# Patient Record
Sex: MALE | Race: WHITE | NOT HISPANIC OR LATINO | Employment: FULL TIME | URBAN - METROPOLITAN AREA
[De-identification: names, ages, dates, MRNs, and addresses within clinical notes are randomized per-mention and may not be internally consistent; named-entity substitution may affect disease eponyms.]

---

## 2017-06-19 ENCOUNTER — OFFICE VISIT (OUTPATIENT)
Dept: FAMILY MEDICINE CLINIC | Facility: CLINIC | Age: 47
End: 2017-06-19

## 2017-06-19 VITALS
DIASTOLIC BLOOD PRESSURE: 82 MMHG | BODY MASS INDEX: 30.21 KG/M2 | SYSTOLIC BLOOD PRESSURE: 144 MMHG | RESPIRATION RATE: 16 BRPM | HEIGHT: 70 IN | TEMPERATURE: 98.2 F | WEIGHT: 211 LBS | OXYGEN SATURATION: 98 % | HEART RATE: 101 BPM

## 2017-06-19 DIAGNOSIS — M25.561 ACUTE PAIN OF RIGHT KNEE: Primary | ICD-10-CM

## 2017-06-19 DIAGNOSIS — M25.461 KNEE EFFUSION, RIGHT: ICD-10-CM

## 2017-06-19 PROCEDURE — 99213 OFFICE O/P EST LOW 20 MIN: CPT | Performed by: FAMILY MEDICINE

## 2017-06-19 RX ORDER — TRAMADOL HYDROCHLORIDE 50 MG/1
50 TABLET ORAL EVERY 6 HOURS PRN
Qty: 15 TABLET | Refills: 0 | Status: SHIPPED | OUTPATIENT
Start: 2017-06-19 | End: 2017-06-22 | Stop reason: SDUPTHER

## 2017-06-19 RX ORDER — PREDNISONE 10 MG/1
TABLET ORAL
Qty: 30 TABLET | Refills: 0 | Status: SHIPPED | OUTPATIENT
Start: 2017-06-19 | End: 2019-04-22

## 2017-06-19 NOTE — PROGRESS NOTES
"  Chief Complaint   Patient presents with   • rt knee pain     pt isn't able to apply weight, bend or straighten without pain       Subjective     Knee Pain    The incident occurred 5 to 7 days ago (had moved heavy htings and knee sore then . Went to work on Friday and increasd pain over the weekend and this am + swollen and motre pain ). The pain is present in the right knee. The quality of the pain is described as aching. The pain is moderate. The pain has been constant since onset. Associated symptoms include an inability to bear weight (able to work to pt weight on  it) and a loss of motion. Pertinent negatives include no numbness or tingling. He reports no foreign bodies present. The symptoms are aggravated by movement, weight bearing and palpation. Treatments tried: at 4:30, ibuprofen 800 mg and no help. The treatment provided no relief.       Past Medical History,Medications, Allergies, and social history was reviewed.    Review of Systems   Constitutional: Negative.    HENT: Negative.    Respiratory: Negative.    Cardiovascular: Negative.    Gastrointestinal: Negative.    Musculoskeletal: Positive for arthralgias, gait problem and joint swelling.   Neurological: Negative for tingling and numbness.   Psychiatric/Behavioral: Negative.        Objective     Vitals:    06/19/17 1532   BP: 144/82   Pulse: 101   Resp: 16   Temp: 98.2 °F (36.8 °C)   TempSrc: Temporal Artery    SpO2: 98%   Weight: 211 lb (95.7 kg)   Height: 70\" (177.8 cm)        Physical Exam   Constitutional: He is oriented to person, place, and time. He appears well-developed and well-nourished.   HENT:   Head: Normocephalic and atraumatic.   Right Ear: External ear normal.   Left Ear: External ear normal.   Eyes: EOM are normal. Pupils are equal, round, and reactive to light.   Pulmonary/Chest: Effort normal.   Musculoskeletal:        Right knee: He exhibits decreased range of motion, swelling and effusion. He exhibits normal patellar mobility. " Tenderness (lateral to the patella) found.   Difficult to assess stability due to significant swelling   Neurological: He is alert and oriented to person, place, and time.   Skin: Skin is warm and dry.   Psychiatric: He has a normal mood and affect. His behavior is normal.   Nursing note and vitals reviewed.        Assessment/Plan     Problem List Items Addressed This Visit     None      Visit Diagnoses     Acute pain of right knee    -  Primary    Relevant Medications    predniSONE (DELTASONE) 10 MG tablet    traMADol (ULTRAM) 50 MG tablet    Knee effusion, right        Relevant Medications    predniSONE (DELTASONE) 10 MG tablet    traMADol (ULTRAM) 50 MG tablet           Follow up: Return if symptoms worsen or fail to improve.     Arnol dated on 2017   was reviewed and appropriate.      DISCUSSION  We will give prednisone taper.  No other anti-inflammatory medication at this time.  Ice.  Rest.  Elevate and wrap.  If not improving by the end of the week, we will need further evaluation.  Off work this week.  Call sooner with problems.  Tramadol for severe pain.    MEDICATIONS PRESCRIBED  Requested Prescriptions     Signed Prescriptions Disp Refills   • predniSONE (DELTASONE) 10 MG tablet 30 tablet 0     Si po daily x 3 days then 3 po daily x 3 days then 2 po daily x 3 days then 1 po daily x 3 days   • traMADol (ULTRAM) 50 MG tablet 15 tablet 0     Sig: Take 1 tablet by mouth Every 6 (Six) Hours As Needed for Moderate Pain (4-6).          Jesse Power MD

## 2017-06-22 ENCOUNTER — TELEPHONE (OUTPATIENT)
Dept: FAMILY MEDICINE CLINIC | Facility: CLINIC | Age: 47
End: 2017-06-22

## 2017-06-22 DIAGNOSIS — M25.561 ACUTE PAIN OF RIGHT KNEE: Primary | ICD-10-CM

## 2017-06-22 DIAGNOSIS — M25.461 KNEE EFFUSION, RIGHT: ICD-10-CM

## 2017-06-22 RX ORDER — TRAMADOL HYDROCHLORIDE 50 MG/1
50 TABLET ORAL EVERY 6 HOURS PRN
Qty: 30 TABLET | Refills: 0 | OUTPATIENT
Start: 2017-06-22 | End: 2019-04-22

## 2017-06-22 NOTE — TELEPHONE ENCOUNTER
----- Message from Ro Haywood sent at 6/22/2017 10:56 AM EDT -----  Contact: SLADE  PATIENT WIFE CALLED BECAUSE SOUMYA'S KNEE IS NOT ANY BETTER, STILL SWOLLEN AND ACTUALLY WORSE THAT IT WAS ON Monday.  IF YOU WANT TO GO AHEAD AN ORDER THE MRI, AND HAVE THE NURSE TO GIVE HIM A CALL BACK.   NO PREFERENCE ON WHERE THE MRI WOULD BE DONE.     [phone]

## 2017-06-22 NOTE — TELEPHONE ENCOUNTER
SPOKE WITH PT WIFE AND SHE STATES THAT PT WILL NEED DR NOTE AS HIS KNEE IS WORSE TODAY THAN IT HAS BEEN THE WHOLE TIME. WIFE ASKING IF WE CAN GET THIS STAT FOR PT.

## 2017-06-22 NOTE — TELEPHONE ENCOUNTER
Please call.  I have placed an order.  The insurance may deny initially and we may need to get a regular x-ray but going to try for the MRI first.

## 2017-06-22 NOTE — TELEPHONE ENCOUNTER
----- Message from Ro Haywood sent at 6/22/2017  2:37 PM EDT -----  Contact: SLADE  PATIENT WIFE CALLED BACK AND SHE IS CONCERNED ABOUT HIM HAVING ENOUGH PAIN MEDICATION TO GET HIM THROUGH THE WEEK END AND UNTIL HE CAN GET IN TO SEE THE SPECIALIST. HAVING PROBLEMS WITH HIS INSURANCE.     SHE CAN COME AND

## 2017-06-26 ENCOUNTER — TELEPHONE (OUTPATIENT)
Dept: FAMILY MEDICINE CLINIC | Facility: CLINIC | Age: 47
End: 2017-06-26

## 2017-06-28 ENCOUNTER — APPOINTMENT (OUTPATIENT)
Dept: MRI IMAGING | Facility: HOSPITAL | Age: 47
End: 2017-06-28

## 2017-06-29 ENCOUNTER — TELEPHONE (OUTPATIENT)
Dept: FAMILY MEDICINE CLINIC | Facility: CLINIC | Age: 47
End: 2017-06-29

## 2017-06-29 NOTE — TELEPHONE ENCOUNTER
----- Message from Carlene Santos sent at 6/29/2017 11:40 AM EDT -----  Contact: Moreno Clement returned your call about his results.     He would like a call back.

## 2017-06-30 ENCOUNTER — TELEPHONE (OUTPATIENT)
Dept: FAMILY MEDICINE CLINIC | Facility: CLINIC | Age: 47
End: 2017-06-30

## 2017-06-30 NOTE — TELEPHONE ENCOUNTER
Okay to write note to return to work on 7/5/2017 for light duty with no lifting greater than 30 pounds through 7/18/2017.

## 2017-06-30 NOTE — TELEPHONE ENCOUNTER
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 HE WANTS TO RETURN ON July 5 WITH [employer] STATING THAT HE CAN'T LIFT MORE THAN 30 LBS FOR A COUPLE OF WEEKS TILL HE FEELS ONE HUNDRED PERCENT BETTER.  [phone]

## 2019-04-22 ENCOUNTER — OFFICE VISIT (OUTPATIENT)
Dept: FAMILY MEDICINE CLINIC | Facility: CLINIC | Age: 49
End: 2019-04-22

## 2019-04-22 VITALS
SYSTOLIC BLOOD PRESSURE: 160 MMHG | HEART RATE: 90 BPM | RESPIRATION RATE: 18 BRPM | DIASTOLIC BLOOD PRESSURE: 88 MMHG | WEIGHT: 209 LBS | BODY MASS INDEX: 29.92 KG/M2 | TEMPERATURE: 99 F | HEIGHT: 70 IN

## 2019-04-22 DIAGNOSIS — M25.561 ACUTE PAIN OF RIGHT KNEE: Primary | ICD-10-CM

## 2019-04-22 PROCEDURE — 99213 OFFICE O/P EST LOW 20 MIN: CPT | Performed by: FAMILY MEDICINE

## 2019-04-22 RX ORDER — TRAMADOL HYDROCHLORIDE 50 MG/1
50 TABLET ORAL EVERY 6 HOURS PRN
Qty: 20 TABLET | Refills: 0 | Status: SHIPPED | OUTPATIENT
Start: 2019-04-22 | End: 2019-05-15

## 2019-04-22 RX ORDER — PREDNISONE 20 MG/1
TABLET ORAL
Qty: 20 TABLET | Refills: 0 | Status: SHIPPED | OUTPATIENT
Start: 2019-04-22 | End: 2019-05-15

## 2019-04-22 NOTE — PROGRESS NOTES
Assessment/Plan       Problems Addressed this Visit     None      Visit Diagnoses     Acute pain of right knee    -  Primary    Possible gout    Relevant Medications    predniSONE (DELTASONE) 20 MG tablet    traMADol (ULTRAM) 50 MG tablet    Other Relevant Orders    CBC & Differential    Uric Acid            Follow up: Return if symptoms worsen or fail to improve.     DISCUSSION  Acute pain and swelling of the right knee.  May be gout.  Check labs as noted.  Start prednisone and tramadol for pain.  Side effects explained.  Call or follow-up if not improving or sooner if worsening.      MEDICATIONS PRESCRIBED  Requested Prescriptions     Signed Prescriptions Disp Refills   • predniSONE (DELTASONE) 20 MG tablet 20 tablet 0     Sig: 3 po x 3 days then 2 po x 3 days then 1 po x 3 days then 1/2 po x 3 days   • traMADol (ULTRAM) 50 MG tablet 20 tablet 0     Sig: Take 1 tablet by mouth Every 6 (Six) Hours As Needed for Moderate Pain .             Arnol dated on 4/22/2019   was reviewed and appropriate.        -------------------------------------------    Subjective     Chief Complaint   Patient presents with   • Knee Pain     right, 4 days          Knee Pain    The incident occurred 3 to 5 days ago (history of injury long ago, history fo gout, and feels similaryly). There was no injury mechanism. The pain is present in the right knee (pain began in the back of the knee. + swelling and now pain in the front ). The pain is moderate. The symptoms are aggravated by movement and weight bearing. He has tried ice and heat for the symptoms.       Started Friday and was not able to extend the leg and needed to keep bent  Hurts to walk on it  Anterior medial pain and tenderness        Social History     Tobacco Use   Smoking Status Current Some Day Smoker   • Types: Cigarettes   Smokeless Tobacco Never Used        Past Medical History,Medications, Allergies, and social history was reviewed.      Review of Systems  "  Constitutional: Negative.  Negative for fever.   HENT: Negative.    Respiratory: Negative.    Cardiovascular: Negative.    Gastrointestinal: Negative.    Musculoskeletal: Positive for arthralgias.   Psychiatric/Behavioral: Positive for sleep disturbance.       Objective     Vitals:    04/22/19 1054   BP: 160/88   Pulse: 90   Resp: 18   Temp: 99 °F (37.2 °C)   Weight: 94.8 kg (209 lb)   Height: 177.8 cm (70\")          Physical Exam   Constitutional: He is oriented to person, place, and time. He appears well-developed and well-nourished.   HENT:   Head: Normocephalic and atraumatic.   Right Ear: External ear normal.   Left Ear: External ear normal.   Eyes: EOM are normal. Pupils are equal, round, and reactive to light.   Cardiovascular: Normal rate, regular rhythm and normal heart sounds.   Pulmonary/Chest: Effort normal and breath sounds normal. No respiratory distress. He has no wheezes. He has no rales.   Musculoskeletal:        Right knee: He exhibits decreased range of motion, swelling and effusion. Tenderness (left anterior ) found.   Neurological: He is alert and oriented to person, place, and time.   Skin: Skin is warm and dry.   Psychiatric: He has a normal mood and affect. His behavior is normal.   Nursing note and vitals reviewed.    No redness of the knee but there is some mild warmth.            Jesse Power MD    "

## 2019-04-23 LAB
BASOPHILS # BLD AUTO: 0.06 10*3/MM3 (ref 0–0.2)
BASOPHILS NFR BLD AUTO: 0.8 % (ref 0–1.5)
EOSINOPHIL # BLD AUTO: 0.11 10*3/MM3 (ref 0–0.4)
EOSINOPHIL NFR BLD AUTO: 1.4 % (ref 0.3–6.2)
ERYTHROCYTE [DISTWIDTH] IN BLOOD BY AUTOMATED COUNT: 13.1 % (ref 12.3–15.4)
HCT VFR BLD AUTO: 51.3 % (ref 37.5–51)
HGB BLD-MCNC: 17 G/DL (ref 13–17.7)
IMM GRANULOCYTES # BLD AUTO: 0.02 10*3/MM3 (ref 0–0.05)
IMM GRANULOCYTES NFR BLD AUTO: 0.3 % (ref 0–0.5)
LYMPHOCYTES # BLD AUTO: 1.65 10*3/MM3 (ref 0.7–3.1)
LYMPHOCYTES NFR BLD AUTO: 20.8 % (ref 19.6–45.3)
MCH RBC QN AUTO: 31.9 PG (ref 26.6–33)
MCHC RBC AUTO-ENTMCNC: 33.1 G/DL (ref 31.5–35.7)
MCV RBC AUTO: 96.2 FL (ref 79–97)
MONOCYTES # BLD AUTO: 0.47 10*3/MM3 (ref 0.1–0.9)
MONOCYTES NFR BLD AUTO: 5.9 % (ref 5–12)
NEUTROPHILS # BLD AUTO: 5.63 10*3/MM3 (ref 1.7–7)
NEUTROPHILS NFR BLD AUTO: 70.8 % (ref 42.7–76)
NRBC BLD AUTO-RTO: 0 /100 WBC (ref 0–0.2)
PLATELET # BLD AUTO: 194 10*3/MM3 (ref 140–450)
RBC # BLD AUTO: 5.33 10*6/MM3 (ref 4.14–5.8)
URATE SERPL-MCNC: 6.2 MG/DL (ref 3.4–7)
WBC # BLD AUTO: 7.94 10*3/MM3 (ref 3.4–10.8)

## 2019-05-15 ENCOUNTER — OFFICE VISIT (OUTPATIENT)
Dept: FAMILY MEDICINE CLINIC | Facility: CLINIC | Age: 49
End: 2019-05-15

## 2019-05-15 VITALS
HEART RATE: 78 BPM | HEIGHT: 70 IN | WEIGHT: 214 LBS | TEMPERATURE: 99 F | BODY MASS INDEX: 30.64 KG/M2 | DIASTOLIC BLOOD PRESSURE: 72 MMHG | SYSTOLIC BLOOD PRESSURE: 124 MMHG | RESPIRATION RATE: 16 BRPM

## 2019-05-15 DIAGNOSIS — B34.8 PARAINFLUENZA: Primary | ICD-10-CM

## 2019-05-15 LAB
EXPIRATION DATE: NORMAL
FLUAV AG NPH QL: NEGATIVE
FLUBV AG NPH QL: NEGATIVE
INTERNAL CONTROL: NORMAL
Lab: NORMAL

## 2019-05-15 PROCEDURE — 87804 INFLUENZA ASSAY W/OPTIC: CPT | Performed by: FAMILY MEDICINE

## 2019-05-15 PROCEDURE — 99213 OFFICE O/P EST LOW 20 MIN: CPT | Performed by: FAMILY MEDICINE

## 2019-05-15 RX ORDER — IBUPROFEN 800 MG/1
800 TABLET ORAL EVERY 6 HOURS PRN
Qty: 30 TABLET | Refills: 0 | Status: SHIPPED | OUTPATIENT
Start: 2019-05-15 | End: 2019-10-30 | Stop reason: SDUPTHER

## 2019-05-15 NOTE — PROGRESS NOTES
Subjective   Moreno Dinero is a 48 y.o. male.     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   HENT: Negative for congestion, sinus pressure and sore throat.    Respiratory: Negative for cough and shortness of breath.    Cardiovascular: Negative for chest pain.   Gastrointestinal: Negative for constipation, diarrhea, nausea and vomiting.   Musculoskeletal: Positive for myalgias.   Skin: Negative for rash.       Objective   Physical Exam   Constitutional: He appears well-developed and well-nourished.   HENT:   Head: Normocephalic and atraumatic.   Right Ear: Hearing, tympanic membrane, external ear and ear canal normal.   Left Ear: Hearing, tympanic membrane, external ear and ear canal normal.   Nose: Nose normal.   Mouth/Throat: Uvula is midline, oropharynx is clear and moist and mucous membranes are normal.   Eyes: Conjunctivae and EOM are normal.   Neck: Normal range of motion.   Cardiovascular: Normal rate, regular rhythm and normal heart sounds.   Pulmonary/Chest: Effort normal and breath sounds normal.   Lymphadenopathy:     He has no cervical adenopathy.   Psychiatric: He has a normal mood and affect. His behavior is normal.   Nursing note and vitals reviewed.      Assessment/Plan   Moreno was seen today for generalized body aches and fever.    Diagnoses and all orders for this visit:    Parainfluenza  -     POC Influenza A / B    Other orders  -     ibuprofen (ADVIL,MOTRIN) 800 MG tablet; Take 1 tablet by mouth Every 6 (Six) Hours As Needed for Mild Pain .    flu negative,  suspect parainfluenza.  Treat with fluids and NSAIDs.  gatorade and ibuprofen script sent in.  Rest, fluids and off work today and tomorrow.  Pt to call with any other issues.

## 2019-10-30 ENCOUNTER — HOSPITAL ENCOUNTER (OUTPATIENT)
Dept: GENERAL RADIOLOGY | Facility: HOSPITAL | Age: 49
Discharge: HOME | End: 2019-10-30
Admitting: PHYSICIAN ASSISTANT

## 2019-10-30 ENCOUNTER — OFFICE VISIT (OUTPATIENT)
Dept: FAMILY MEDICINE CLINIC | Facility: CLINIC | Age: 49
End: 2019-10-30

## 2019-10-30 VITALS
DIASTOLIC BLOOD PRESSURE: 72 MMHG | TEMPERATURE: 98.4 F | HEART RATE: 80 BPM | OXYGEN SATURATION: 98 % | SYSTOLIC BLOOD PRESSURE: 126 MMHG | WEIGHT: 207.2 LBS | RESPIRATION RATE: 18 BRPM | BODY MASS INDEX: 29.73 KG/M2

## 2019-10-30 DIAGNOSIS — M79.671 RIGHT FOOT PAIN: Primary | ICD-10-CM

## 2019-10-30 DIAGNOSIS — M77.41 METATARSALGIA OF RIGHT FOOT: ICD-10-CM

## 2019-10-30 PROCEDURE — 73630 X-RAY EXAM OF FOOT: CPT

## 2019-10-30 PROCEDURE — 99213 OFFICE O/P EST LOW 20 MIN: CPT | Performed by: PHYSICIAN ASSISTANT

## 2019-10-30 RX ORDER — IBUPROFEN 800 MG/1
800 TABLET ORAL EVERY 6 HOURS PRN
Qty: 30 TABLET | Refills: 2 | Status: SHIPPED | OUTPATIENT
Start: 2019-10-30

## 2019-10-30 NOTE — PROGRESS NOTES
Subjective   Moreno Dinero is a 49 y.o. male.     History of Present Illness   Pt presents with CC of R foot pain. No injury. Pain is sore and throbbing in nature. No injury. No swelling or skin changes. Felt along width of forefoot. Painful to ambulate. Started yesterday. Hx of right ankle fracture and R knee issues.   Works in maintenance so on feet all day. Using OTC foot supports.   No hx of gout   No numbness or tingling of distal extremity     The following portions of the patient's history were reviewed and updated as appropriate: allergies, current medications, past family history, past medical history, past social history, past surgical history and problem list.    Review of Systems   Constitutional: Negative.  Negative for chills, diaphoresis, fatigue and fever.   HENT: Negative.  Negative for congestion, ear discharge, ear pain, hearing loss, nosebleeds, postnasal drip, sinus pressure, sneezing and sore throat.    Eyes: Negative.    Respiratory: Negative.  Negative for cough, chest tightness, shortness of breath and wheezing.    Cardiovascular: Negative.  Negative for chest pain, palpitations and leg swelling.   Gastrointestinal: Negative for abdominal distention, abdominal pain, blood in stool, constipation, diarrhea, nausea and vomiting.   Genitourinary: Negative.  Negative for difficulty urinating, dysuria, flank pain, frequency, hematuria and urgency.   Musculoskeletal: Positive for arthralgias and gait problem. Negative for back pain, joint swelling, myalgias, neck pain and neck stiffness.   Skin: Negative.  Negative for color change, pallor, rash and wound.   Neurological: Negative for dizziness, syncope, weakness, light-headedness, numbness and headaches.       Objective    Blood pressure 126/72, pulse 80, temperature 98.4 °F (36.9 °C), resp. rate 18, weight 94 kg (207 lb 3.2 oz), SpO2 98 %.     Physical Exam   Constitutional: He is oriented to person, place, and time. He appears well-developed  and well-nourished.   HENT:   Head: Normocephalic and atraumatic.   Right Ear: External ear normal.   Left Ear: External ear normal.   Nose: Nose normal.   Eyes: Conjunctivae are normal.   Cardiovascular: Normal rate, regular rhythm and normal heart sounds.   Pulmonary/Chest: Effort normal and breath sounds normal. No respiratory distress. He has no wheezes. He has no rales. He exhibits no tenderness.   Abdominal: No hernia.   Musculoskeletal: He exhibits no edema or deformity.        Right foot: There is tenderness and bony tenderness. There is normal range of motion.   Antalgic gait         Neurological: He is alert and oriented to person, place, and time.   Skin: Skin is warm and dry.   Psychiatric: He has a normal mood and affect. His behavior is normal. Judgment and thought content normal.   Nursing note and vitals reviewed.      Assessment/Plan   Moreno was seen today for lower extremity issue.    Diagnoses and all orders for this visit:    Right foot pain  -     XR Foot 3+ View Right  -     ibuprofen (ADVIL,MOTRIN) 800 MG tablet; Take 1 tablet by mouth Every 6 (Six) Hours As Needed for Mild Pain .    Metatarsalgia of right foot  -     ibuprofen (ADVIL,MOTRIN) 800 MG tablet; Take 1 tablet by mouth Every 6 (Six) Hours As Needed for Mild Pain .      Proceed with XR as noted. Likely metatarsalgia: rest, ice, elevation and NSAID. F/u pending imaging.

## 2019-10-30 NOTE — PROGRESS NOTES
I have reviewed the notes, assessments, and/or procedures performed by Rush Zambrano, I concur with her documentation of Moreno Dinero.

## 2020-01-27 ENCOUNTER — OFFICE VISIT (OUTPATIENT)
Dept: FAMILY MEDICINE CLINIC | Facility: CLINIC | Age: 50
End: 2020-01-27

## 2020-01-27 VITALS
BODY MASS INDEX: 31.07 KG/M2 | DIASTOLIC BLOOD PRESSURE: 88 MMHG | TEMPERATURE: 97.8 F | SYSTOLIC BLOOD PRESSURE: 142 MMHG | HEIGHT: 70 IN | WEIGHT: 217 LBS | HEART RATE: 97 BPM | OXYGEN SATURATION: 98 % | RESPIRATION RATE: 18 BRPM

## 2020-01-27 DIAGNOSIS — J06.9 VIRAL UPPER RESPIRATORY TRACT INFECTION: Primary | ICD-10-CM

## 2020-01-27 PROCEDURE — 99213 OFFICE O/P EST LOW 20 MIN: CPT | Performed by: FAMILY MEDICINE

## 2020-01-27 NOTE — PROGRESS NOTES
Assessment/Plan       Problems Addressed this Visit     None      Visit Diagnoses     Viral upper respiratory tract infection    -  Primary            Follow up: Return if symptoms worsen or fail to improve.     DISCUSSION  Most likely viral infection.  No bacterial cause seen.  Reassured.  Increase fluids and rest.  Over-the-counter symptomatic relief.  Call or follow-up if not improving or worsening.  He is improving at this time.    Recommend follow-up for complete physical later this year.  Will need blood work and set up for colon cancer screening either Cologuard or colonoscopy.    Recommend increase fluids.  Decrease spicy foods to see if that helps with halitosis.  If not, may need further work-up.         MEDICATIONS PRESCRIBED  Requested Prescriptions      No prescriptions requested or ordered in this encounter          -------------------------------------------    Subjective     Chief Complaint   Patient presents with   • Cough     since saturday    • Fever   • Nasal Congestion     pressure          URI    This is a new problem. Episode onset: x 2-3 days, sat afternoon. The problem has been gradually improving. Maximum temperature: subj fever. Associated symptoms include congestion (not coming out nose), coughing ( dry), headaches (pressure), neck pain (sore feeling), sinus pain ( forehead and cheek ) and a sore throat (hurt on sat). Pertinent negatives include no ear pain. Associated symptoms comments: Has some shortness of breath. Seems better today. Treatments tried: daytime and nighttime kathy seltzer and tylenol for fever. The treatment provided mild relief.   ears ringing  No travel recently    Does feel a good bit better today      Boss had been sick  boss had been to [place]  Had a viral infection and caused heart issues ( cardiomyopathy). He is now on a LifeVest    Halitosis  Drinks propel and vitamin water  Does eat a lot spicy food  Had for year or more            Social History     Tobacco Use  "  Smoking Status Current Some Day Smoker   • Types: Cigarettes   Smokeless Tobacco Never Used          Past Medical History,Medications, Allergies, and social history was reviewed.          Review of Systems   Constitutional: Positive for fever.   HENT: Positive for congestion (not coming out nose) and sore throat (hurt on sat). Negative for ear pain.    Respiratory: Positive for cough ( dry).    Cardiovascular: Negative.    Gastrointestinal: Negative.    Musculoskeletal: Positive for neck pain (sore feeling).   Psychiatric/Behavioral: Negative.        Objective     Vitals:    01/27/20 1133 01/27/20 1213   BP: 158/86 142/88   Pulse: 97    Resp: 18    Temp: 97.8 °F (36.6 °C)    SpO2: 98%    Weight: 98.4 kg (217 lb)    Height: 177.8 cm (70\")           Physical Exam   Constitutional: Vital signs are normal. He appears well-developed and well-nourished.   HENT:   Head: Normocephalic and atraumatic.   Right Ear: Hearing, tympanic membrane, external ear and ear canal normal.   Left Ear: Hearing, tympanic membrane, external ear and ear canal normal.   Mouth/Throat: Oropharynx is clear and moist.   Eyes: Pupils are equal, round, and reactive to light. Conjunctivae, EOM and lids are normal.   Neck: Normal range of motion. Neck supple. No thyromegaly present.   Cardiovascular: Normal rate, regular rhythm and normal heart sounds. Exam reveals no friction rub.   No murmur heard.  Pulmonary/Chest: Effort normal and breath sounds normal. No respiratory distress. He has no wheezes. He has no rales.   Abdominal: Normal appearance.   Musculoskeletal: He exhibits no edema.   Neurological: He is alert. He has normal strength.   Skin: Skin is warm and dry.   Psychiatric: He has a normal mood and affect. His speech is normal. Cognition and memory are normal.   Nursing note and vitals reviewed.                Jesse Power MD    "

## 2020-04-16 ENCOUNTER — TELEPHONE (OUTPATIENT)
Dept: FAMILY MEDICINE CLINIC | Facility: CLINIC | Age: 50
End: 2020-04-16

## 2020-04-16 RX ORDER — PREDNISONE 20 MG/1
TABLET ORAL
Qty: 20 TABLET | Refills: 0 | Status: SHIPPED | OUTPATIENT
Start: 2020-04-16 | End: 2020-12-17 | Stop reason: SDUPTHER

## 2020-04-16 NOTE — TELEPHONE ENCOUNTER
Patient's wife stated that his knee is swollen and wondered if Dr. Power would call him in some Prednisone like he has done before.  He can be reached at [phone]    [facility]

## 2020-04-16 NOTE — TELEPHONE ENCOUNTER
Pt's knee pain and swelling started on Tues evening when he got home from work. Denies redness or heat to the area.  Pt does not want to wait til Monday for this.

## 2020-07-28 ENCOUNTER — TELEPHONE (OUTPATIENT)
Dept: FAMILY MEDICINE CLINIC | Facility: CLINIC | Age: 50
End: 2020-07-28

## 2020-07-28 NOTE — TELEPHONE ENCOUNTER
Pt has NO symptoms. His Wife is going to [facility] today for testing. He'll keep us posted.

## 2020-07-28 NOTE — TELEPHONE ENCOUNTER
Is he having any symptoms?     I may not be able to order a test if no symptoms and no exposure to a definite positive patient.     He may need to go to the [place] state web site and check the site for current covid testing sites for people who have no exposure(to known positive)  and no symptoms.

## 2020-07-28 NOTE — TELEPHONE ENCOUNTER
PATIENT WIFE CALLED AND REPORTS THAT SHE HAS A COUGH AND WAS SENT HOME FROM WORK AND TOLD TO QUARANTINE AND GET COVID-19 TESTED.  PATIENT HAS BEEN AROUND HIS WIFE THEREFORE HIS EMPLOYER  TOLD HIM QUARANTINE AND/OR GET COVID-19 TESTED.    PATIENT IS REQUESTING ORDERS FOR A COVID-19 TEST.    PLEASE CALL AND ADVISE  [phone]

## 2020-12-17 ENCOUNTER — TELEPHONE (OUTPATIENT)
Dept: FAMILY MEDICINE CLINIC | Facility: CLINIC | Age: 50
End: 2020-12-17

## 2020-12-17 RX ORDER — PREDNISONE 20 MG/1
TABLET ORAL
Qty: 20 TABLET | Refills: 0 | Status: SHIPPED | OUTPATIENT
Start: 2020-12-17 | End: 2021-02-22

## 2020-12-17 NOTE — TELEPHONE ENCOUNTER
Caller: PIERRE WRIGHT    Relationship: WIFE    Best call back number: [phone]    Medication needed:   Requested Prescriptions     Pending Prescriptions Disp Refills   • predniSONE (DELTASONE) 20 MG tablet 20 tablet 0     Sig: 3 po x 3 days then 2 po x 3 days then 1 po x 3 days then 1/2 po x 3 days       When do you need the refill by: ASAP    What details did the patient provide when requesting the medication: PATIENT IS EXPERIENCING SWELLING AND PAIN IN KNEE AGAIN  PATIENT WOULD LIKE TO HAVE THIS MEDICATION REFILLED BECAUSE IT WORKED LAST TIME    Does the patient have less than a 3 day supply:  [x] Yes  [] No    What is the patient's preferred pharmacy: [facility] - [address] [phone] [facility] [phone] FX

## 2020-12-17 NOTE — TELEPHONE ENCOUNTER
Please call, requested meds sent to pharmacy. BUt if happens again, rec recheck in the office.

## 2021-02-22 ENCOUNTER — OFFICE VISIT (OUTPATIENT)
Dept: FAMILY MEDICINE CLINIC | Facility: CLINIC | Age: 51
End: 2021-02-22

## 2021-02-22 VITALS
HEART RATE: 86 BPM | WEIGHT: 219 LBS | BODY MASS INDEX: 31.35 KG/M2 | SYSTOLIC BLOOD PRESSURE: 146 MMHG | HEIGHT: 70 IN | DIASTOLIC BLOOD PRESSURE: 78 MMHG | RESPIRATION RATE: 18 BRPM | TEMPERATURE: 98.2 F

## 2021-02-22 DIAGNOSIS — M54.50 LUMBAR BACK PAIN: ICD-10-CM

## 2021-02-22 DIAGNOSIS — R10.9 RIGHT FLANK PAIN: Primary | ICD-10-CM

## 2021-02-22 LAB
BILIRUB BLD-MCNC: NEGATIVE MG/DL
CLARITY, POC: CLEAR
COLOR UR: YELLOW
GLUCOSE UR STRIP-MCNC: NEGATIVE MG/DL
KETONES UR QL: NEGATIVE
LEUKOCYTE EST, POC: NEGATIVE
NITRITE UR-MCNC: NEGATIVE MG/ML
PH UR: 6 [PH] (ref 5–8)
PROT UR STRIP-MCNC: NEGATIVE MG/DL
RBC # UR STRIP: NEGATIVE /UL
SP GR UR: 1.02 (ref 1–1.03)
UROBILINOGEN UR QL: NORMAL

## 2021-02-22 PROCEDURE — 81003 URINALYSIS AUTO W/O SCOPE: CPT | Performed by: FAMILY MEDICINE

## 2021-02-22 PROCEDURE — 99213 OFFICE O/P EST LOW 20 MIN: CPT | Performed by: FAMILY MEDICINE

## 2021-02-22 NOTE — PROGRESS NOTES
Assessment/Plan       Diagnoses and all orders for this visit:    1. Right flank pain (Primary)  -     POC Urinalysis Dipstick, Automated    2. Lumbar back pain           Follow up: Return if symptoms worsen or fail to improve.     DISCUSSION  Flank pain and lumbar back pain.  Urinalysis was normal.  Suspect musculoskeletal cause.  Recommend ibuprofen 800 mg 3 times a day for the next 3 to 4 days.  Call or follow-up if not improving.          MEDICATIONS PRESCRIBED  Requested Prescriptions      No prescriptions requested or ordered in this encounter            -------------------------------------------    Subjective     Chief Complaint   Patient presents with   • Back Pain   • Flank Pain         Flank Pain  This is a new problem. The current episode started in the past 7 days (x 4 days). The problem occurs intermittently. The problem has been waxing and waning since onset. Pain location: right low back and radiates to the groin rigth side. The quality of the pain is described as aching. Radiates to: groin. The symptoms are aggravated by position and twisting. Pertinent negatives include no bladder incontinence, dysuria or leg pain. (Occ in hip when gets up  BM ok, no diarrhea  )      Pain increases rotating to the right    Rested this weekend    Better today than in last 4 days    Called in today      2 weeks ago, drinking V8 daily at work  Drinking vitamin water    One kidney stone in the past  10-12 yrs  Had intense pain then  Could feel it move and then passed quickly    Wife had a bad yeast infection          Social History     Tobacco Use   Smoking Status Current Some Day Smoker   • Types: Cigarettes   Smokeless Tobacco Never Used          Past Medical History,Medications, Allergies, and social history was reviewed.          Review of Systems   Constitutional: Negative.    HENT: Negative.    Respiratory: Negative.    Cardiovascular: Negative.    Gastrointestinal: Negative.    Genitourinary: Positive for  "flank pain. Negative for dysuria and urinary incontinence.   Musculoskeletal: Positive for back pain.       Objective     Vitals:    02/22/21 1147   BP: 146/78   Pulse: 86   Resp: 18   Temp: 98.2 °F (36.8 °C)   Weight: 99.3 kg (219 lb)   Height: 177.8 cm (70\")          Physical Exam  Vitals signs and nursing note reviewed.   Constitutional:       Appearance: He is well-developed.   HENT:      Head: Normocephalic and atraumatic.      Right Ear: External ear normal.      Left Ear: External ear normal.   Eyes:      Pupils: Pupils are equal, round, and reactive to light.   Cardiovascular:      Rate and Rhythm: Normal rate and regular rhythm.      Heart sounds: Normal heart sounds.   Pulmonary:      Effort: Pulmonary effort is normal. No respiratory distress.      Breath sounds: Normal breath sounds. No wheezing or rales.   Abdominal:      General: Abdomen is flat. Bowel sounds are normal. There is no distension.      Tenderness: There is no abdominal tenderness. There is no guarding or rebound.   Musculoskeletal:      Lumbar back: He exhibits decreased range of motion and tenderness ( Paraspinous musculature).   Skin:     General: Skin is warm and dry.   Neurological:      Mental Status: He is alert and oriented to person, place, and time.   Psychiatric:         Behavior: Behavior normal.                     Jesse Power MD    "

## 2021-05-07 ENCOUNTER — OFFICE VISIT (OUTPATIENT)
Dept: FAMILY MEDICINE CLINIC | Facility: CLINIC | Age: 51
End: 2021-05-07

## 2021-05-07 VITALS
DIASTOLIC BLOOD PRESSURE: 80 MMHG | OXYGEN SATURATION: 95 % | TEMPERATURE: 99 F | WEIGHT: 220.4 LBS | SYSTOLIC BLOOD PRESSURE: 130 MMHG | BODY MASS INDEX: 31.62 KG/M2 | HEART RATE: 85 BPM

## 2021-05-07 DIAGNOSIS — M62.838 SPASM OF RIGHT PIRIFORMIS MUSCLE: Primary | ICD-10-CM

## 2021-05-07 DIAGNOSIS — M54.31 RIGHT SIDED SCIATICA: ICD-10-CM

## 2021-05-07 PROCEDURE — 96372 THER/PROPH/DIAG INJ SC/IM: CPT | Performed by: FAMILY MEDICINE

## 2021-05-07 PROCEDURE — 99213 OFFICE O/P EST LOW 20 MIN: CPT | Performed by: FAMILY MEDICINE

## 2021-05-07 RX ORDER — TRIAMCINOLONE ACETONIDE 40 MG/ML
40 INJECTION, SUSPENSION INTRA-ARTICULAR; INTRAMUSCULAR ONCE
Status: COMPLETED | OUTPATIENT
Start: 2021-05-07 | End: 2021-05-07

## 2021-05-07 RX ORDER — PREDNISONE 10 MG/1
10 TABLET ORAL DAILY
Qty: 21 TABLET | Refills: 0 | Status: SHIPPED | OUTPATIENT
Start: 2021-05-07

## 2021-05-07 RX ORDER — CYCLOBENZAPRINE HCL 10 MG
10 TABLET ORAL 3 TIMES DAILY PRN
Qty: 30 TABLET | Refills: 0 | Status: SHIPPED | OUTPATIENT
Start: 2021-05-07

## 2021-05-07 RX ADMIN — TRIAMCINOLONE ACETONIDE 40 MG: 40 INJECTION, SUSPENSION INTRA-ARTICULAR; INTRAMUSCULAR at 15:45

## 2021-05-07 NOTE — PROGRESS NOTES
Chief Complaint  Muscle Pain and Sciatica    Subjective          Moreno Dinero presents to [facility]  Back Pain  This is a new problem. The current episode started in the past 7 days. The problem occurs constantly. The pain is present in the gluteal. The quality of the pain is described as aching and shooting. The pain radiates to the right knee. The pain is moderate. The pain is worse during the day. The symptoms are aggravated by standing. Stiffness is present in the morning. Associated symptoms include leg pain. Pertinent negatives include no numbness or tingling. Risk factors include obesity. He has tried NSAIDs and bed rest (IcyHot) for the symptoms. The treatment provided mild relief.   Muscle in right buttock feels tight.      The following portions of the patient's history were reviewed and updated as appropriate: allergies, current medications, past family history, past medical history, past social history, past surgical history and problem list.    Objective   Vital Signs:   /80   Pulse 85   Temp 99 °F (37.2 °C) (Temporal)   Wt 100 kg (220 lb 6.4 oz)   SpO2 95%   BMI 31.62 kg/m²     Physical Exam  Vitals and nursing note reviewed.   Constitutional:       Appearance: He is well-developed.   HENT:      Head: Normocephalic and atraumatic.      Nose: Nose normal.   Eyes:      Conjunctiva/sclera: Conjunctivae normal.   Cardiovascular:      Rate and Rhythm: Normal rate and regular rhythm.      Heart sounds: Normal heart sounds.   Pulmonary:      Effort: Pulmonary effort is normal. No respiratory distress.      Breath sounds: Normal breath sounds.   Musculoskeletal:         General: No deformity.   Skin:     General: Skin is warm and dry.   Neurological:      General: No focal deficit present.      Mental Status: He is alert and oriented to person, place, and time.   Psychiatric:         Behavior: Behavior normal.        Result Review :                 Assessment  and Plan    Diagnoses and all orders for this visit:    1. Spasm of right piriformis muscle (Primary)  -     triamcinolone acetonide (KENALOG-40) injection 40 mg  -     predniSONE (DELTASONE) 10 MG tablet; Take 1 tablet by mouth Daily.  Dispense: 21 tablet; Refill: 0  -     cyclobenzaprine (FLEXERIL) 10 MG tablet; Take 1 tablet by mouth 3 (Three) Times a Day As Needed for Muscle Spasms.  Dispense: 30 tablet; Refill: 0    2. Right sided sciatica  -     triamcinolone acetonide (KENALOG-40) injection 40 mg  -     predniSONE (DELTASONE) 10 MG tablet; Take 1 tablet by mouth Daily.  Dispense: 21 tablet; Refill: 0  -     cyclobenzaprine (FLEXERIL) 10 MG tablet; Take 1 tablet by mouth 3 (Three) Times a Day As Needed for Muscle Spasms.  Dispense: 30 tablet; Refill: 0    Kenalog provided in office. Steroid taper and muscle relaxer to treat.  He is aware that muscle relaxer can cause drowsiness, use with caution.  Follow-up if symptoms do not resolve.      Follow Up   Return if symptoms worsen or fail to improve.  Patient was given instructions and counseling regarding his condition or for health maintenance advice. Please see specific information pulled into the AVS if appropriate.

## 2021-05-11 ENCOUNTER — TELEPHONE (OUTPATIENT)
Dept: FAMILY MEDICINE CLINIC | Facility: CLINIC | Age: 51
End: 2021-05-11

## 2021-05-11 NOTE — TELEPHONE ENCOUNTER
Caller: Moreno Dinero    Relationship: Self    Best call back number: [phone]    What form or medical record are you requesting: WORK EXCUSE FOR Monday AND TUESDAY    Who is requesting this form or medical record from you: PATIENT    How would you like to receive the form or medical records (pick-up, mail, fax): PICKUP TODAY      Timeframe paperwork needed: ASAP    Additional notes: PATIENT WANTED TO KNOW IF HE COULD STOP BY AND  WORK EXCUSE FOR Monday AND Tuesday WHILE HE WAS IN TOWN AROUND 4PM     PLEASE ADVISE

## 2023-01-29 NOTE — PATIENT INSTRUCTIONS
Piriformis Syndrome Rehab  Ask your health care provider which exercises are safe for you. Do exercises exactly as told by your health care provider and adjust them as directed. It is normal to feel mild stretching, pulling, tightness, or discomfort as you do these exercises. Stop right away if you feel sudden pain or your pain gets worse. Do not begin these exercises until told by your health care provider.  Stretching and range-of-motion exercises  These exercises warm up your muscles and joints and improve the movement and flexibility of your hip and pelvis. The exercises also help to relieve pain, numbness, and tingling.  Hip rotation  This is an exercise in which you lie on your back and stretch the muscles that rotate your hip (hip rotators) to stretch your buttocks.  1. Lie on your back on a firm surface.  2. Pull your left / right knee toward your same shoulder with your left / right hand until your knee is pointing toward the ceiling. Hold your left / right ankle with your other hand.  3. Keeping your knee steady, gently pull your left / right ankle toward your other shoulder until you feel a stretch in your buttocks.  4. Hold this position for __________ seconds.  Repeat __________ times. Complete this exercise __________ times a day.  Hip extensor  This is an exercise in which you lie on your back and pull your knee to your chest.  1. Lie on your back on a firm surface. Both of your legs should be straight.  2. Pull your left / right knee to your chest. Hold your leg in this position by holding onto the back of your thigh or the front of your knee.  3. Hold this position for __________ seconds.  4. Slowly return to the starting position.  Repeat __________ times. Complete this exercise __________ times a day.  Strengthening exercises  These exercises build strength and endurance in your hip and thigh muscles. Endurance is the ability to use your muscles for a long time, even after they get  tired.  Straight leg raises, side-lying  This exercise strengthens the muscles that rotate the leg at the hip and move it away from your body (hip abductors).  1. Lie on your side with your left / right leg in the top position. Lie so your head, shoulder, knee, and hip line up. Bend your bottom knee to help you balance.  2. Lift your top leg 4-6 inches (10-15 cm) while keeping your toes pointed straight ahead.  3. Hold this position for __________ seconds.  4. Slowly lower your leg to the starting position.  5. Let your muscles relax completely after each repetition.  Repeat __________ times. Complete this exercise __________ times a day.  Hip abduction and rotation  This is sometimes called quadruped (on hands and knees) exercises.  1. Get on your hands and knees on a firm, lightly padded surface. Your hands should be directly below your shoulders, and your knees should be directly below your hips.  2. Lift your left / right knee out to the side. Keep your knee bent. Do not twist your body.  3. Hold this position for __________ seconds.  4. Slowly lower your leg.  Repeat __________ times. Complete this exercise __________ times a day.  Straight leg raises, face-down  This exercise stretches the muscles that move your hips away from the front of the pelvis (hip extensors).  1. Lie on your abdomen on a bed or a firm surface with a pillow under your hips.  2. Squeeze your buttocks muscles and lift your left / right leg about 4-6 inches (10-15 cm) off the bed. Do not let your back arch.  3. Hold this position for __________ seconds.  4. Slowly lower your leg to the starting position.  5. Let your muscles relax completely after each repetition.  Repeat __________ times. Complete this exercise __________ times a day.  This information is not intended to replace advice given to you by your health care provider. Make sure you discuss any questions you have with your health care provider.  Document Revised: 04/09/2020  Document Reviewed: 10/10/2019  Elsevier Patient Education © 2021 [employer]     29-Jan-2023 18:35

## 2023-04-21 ENCOUNTER — OFFICE VISIT (OUTPATIENT)
Dept: FAMILY MEDICINE CLINIC | Facility: CLINIC | Age: 53
End: 2023-04-21
Payer: COMMERCIAL

## 2023-04-21 VITALS
HEIGHT: 70 IN | BODY MASS INDEX: 29.2 KG/M2 | SYSTOLIC BLOOD PRESSURE: 144 MMHG | DIASTOLIC BLOOD PRESSURE: 84 MMHG | WEIGHT: 204 LBS | TEMPERATURE: 98 F | RESPIRATION RATE: 18 BRPM | HEART RATE: 101 BPM | OXYGEN SATURATION: 97 %

## 2023-04-21 DIAGNOSIS — J01.00 ACUTE NON-RECURRENT MAXILLARY SINUSITIS: Primary | ICD-10-CM

## 2023-04-21 PROCEDURE — 99213 OFFICE O/P EST LOW 20 MIN: CPT | Performed by: FAMILY MEDICINE

## 2023-04-21 RX ORDER — PREDNISONE 20 MG/1
40 TABLET ORAL DAILY
Qty: 10 TABLET | Refills: 0 | Status: SHIPPED | OUTPATIENT
Start: 2023-04-21

## 2023-04-21 RX ORDER — AMOXICILLIN AND CLAVULANATE POTASSIUM 875; 125 MG/1; MG/1
1 TABLET, FILM COATED ORAL 2 TIMES DAILY
Qty: 20 TABLET | Refills: 0 | Status: SHIPPED | OUTPATIENT
Start: 2023-04-21

## 2023-04-21 NOTE — PROGRESS NOTES
Subjective   Moreno Dinero is a 52 y.o. male.     History of Present Illness     7-10 days with lots of R facial pressure, under his nose  Lots of foul smelling mucous form right side the past several days    Tooth pain as well as the sinus pressure    Went to ER and had normal CT head          Review of Systems    Objective   Physical Exam  Vitals and nursing note reviewed.   Constitutional:       General: He is not in acute distress.     Appearance: Normal appearance. He is well-developed.   HENT:      Head: Normocephalic and atraumatic.      Right Ear: Hearing, tympanic membrane, ear canal and external ear normal.      Left Ear: Hearing, tympanic membrane, ear canal and external ear normal.      Nose:      Right Sinus: Maxillary sinus tenderness present.      Mouth/Throat:      Pharynx: Uvula midline.   Eyes:      Conjunctiva/sclera: Conjunctivae normal.   Cardiovascular:      Rate and Rhythm: Normal rate and regular rhythm.      Heart sounds: Normal heart sounds.   Pulmonary:      Effort: Pulmonary effort is normal.      Breath sounds: Normal breath sounds.   Musculoskeletal:      Cervical back: Normal range of motion.   Lymphadenopathy:      Cervical: No cervical adenopathy.   Neurological:      Mental Status: He is alert and oriented to person, place, and time.   Psychiatric:         Mood and Affect: Mood normal.         Behavior: Behavior normal.         Thought Content: Thought content normal.         Judgment: Judgment normal.         Assessment & Plan   Diagnoses and all orders for this visit:    1. Acute non-recurrent maxillary sinusitis (Primary)  -     amoxicillin-clavulanate (Augmentin) 875-125 MG per tablet; Take 1 tablet by mouth 2 (Two) Times a Day.  Dispense: 20 tablet; Refill: 0  -     predniSONE (DELTASONE) 20 MG tablet; Take 2 tablets by mouth Daily.  Dispense: 10 tablet; Refill: 0  -     Chlorcyclizine-Pseudoephed 25-60 MG tablet; 1/2-1 po q 8 hours PRN  Dispense: 30 tablet; Refill:  1    augmentin, pred burst, PRN stahist.  INB would consider getting ENT involved and will request CT from [facility]

## 2023-10-30 ENCOUNTER — OFFICE VISIT (OUTPATIENT)
Dept: FAMILY MEDICINE CLINIC | Facility: CLINIC | Age: 53
End: 2023-10-30
Payer: COMMERCIAL

## 2023-10-30 VITALS
WEIGHT: 209.4 LBS | DIASTOLIC BLOOD PRESSURE: 96 MMHG | HEIGHT: 70 IN | BODY MASS INDEX: 29.98 KG/M2 | SYSTOLIC BLOOD PRESSURE: 162 MMHG | OXYGEN SATURATION: 98 % | TEMPERATURE: 98.2 F | HEART RATE: 101 BPM | RESPIRATION RATE: 19 BRPM

## 2023-10-30 DIAGNOSIS — M25.461 PAIN AND SWELLING OF RIGHT KNEE: Primary | ICD-10-CM

## 2023-10-30 DIAGNOSIS — M25.561 PAIN AND SWELLING OF RIGHT KNEE: Primary | ICD-10-CM

## 2023-10-30 PROCEDURE — 99213 OFFICE O/P EST LOW 20 MIN: CPT | Performed by: PHYSICIAN ASSISTANT

## 2023-10-30 RX ORDER — TRAMADOL HYDROCHLORIDE 50 MG/1
50 TABLET ORAL EVERY 6 HOURS PRN
Qty: 20 TABLET | Refills: 0 | Status: SHIPPED | OUTPATIENT
Start: 2023-10-30

## 2023-10-30 RX ORDER — PREDNISONE 10 MG/1
TABLET ORAL
Qty: 21 EACH | Refills: 0 | Status: SHIPPED | OUTPATIENT
Start: 2023-10-30 | End: 2023-11-03 | Stop reason: SDUPTHER

## 2023-10-30 NOTE — PROGRESS NOTES
"Subjective   Moreno Dinero is a 53 y.o. male.     Knee Pain        Pt presents with CC of right knee pain and swelling. Symptoms started over the weekend. Only recent change in activity had been chasing his dog.  No fall or known injury at this time   Hx with pain and swelling on this side in 2017. Progressed to MRI which did not show any tears, did have small baker's cyst and some arthritic changes. Had another flare in 2019 and treated with steroid taper and tramadol. Was off work for awhile during that time   Symptoms of difficulty weight bearing and fully bending or straightening leg out.   Using crutches  Unable to work at this time   Told by PCP he may be experiencing gout. Joint is not currently red, maybe some mild warmth     The following portions of the patient's history were reviewed and updated as appropriate: allergies, current medications, past family history, past medical history, past social history, past surgical history, and problem list.    Review of Systems  As noted per HPI     Objective   Blood pressure 162/96, pulse 101, temperature 98.2 °F (36.8 °C), resp. rate 19, height 177.8 cm (70\"), weight 95 kg (209 lb 6.4 oz), SpO2 98%.     Physical Exam  Vitals and nursing note reviewed.   Constitutional:       Appearance: Normal appearance.   Cardiovascular:      Rate and Rhythm: Normal rate and regular rhythm.   Pulmonary:      Effort: Pulmonary effort is normal.      Breath sounds: Normal breath sounds.   Musculoskeletal:      Right knee: Bony tenderness present. Decreased range of motion. Tenderness present over the lateral joint line.   Neurological:      Mental Status: He is alert and oriented to person, place, and time.   Psychiatric:         Mood and Affect: Mood normal.         Behavior: Behavior normal.         Assessment & Plan   Diagnoses and all orders for this visit:    1. Pain and swelling of right knee (Primary)  -     predniSONE (DELTASONE) 10 MG (21) dose pack; Use as directed on " package  Dispense: 21 each; Refill: 0    Steroid taper, tramadol, and RICE therapy. Arnol reviewed and appropriate   Work note for this week provided   Pt will update office if symptoms do not improve to proceed with additional imaging or ortho consult

## 2023-11-03 DIAGNOSIS — M25.561 PAIN AND SWELLING OF RIGHT KNEE: ICD-10-CM

## 2023-11-03 DIAGNOSIS — M25.461 PAIN AND SWELLING OF RIGHT KNEE: ICD-10-CM

## 2023-11-03 NOTE — TELEPHONE ENCOUNTER
Patient still has significant swelling in his leg, he says [facility] mentioned extending his time off another week if he was not better by Friday and he is not. Ok to extend office note? Patient will also be out of Prednisone tomorrow, can we refill? No fever, leg is warm but not as hot as it was. Meryl Pabon

## 2023-11-06 ENCOUNTER — OFFICE VISIT (OUTPATIENT)
Dept: FAMILY MEDICINE CLINIC | Facility: CLINIC | Age: 53
End: 2023-11-06
Payer: COMMERCIAL

## 2023-11-06 VITALS
DIASTOLIC BLOOD PRESSURE: 84 MMHG | HEART RATE: 92 BPM | HEIGHT: 70 IN | RESPIRATION RATE: 18 BRPM | OXYGEN SATURATION: 98 % | WEIGHT: 205.5 LBS | SYSTOLIC BLOOD PRESSURE: 126 MMHG | BODY MASS INDEX: 29.42 KG/M2 | TEMPERATURE: 98.9 F

## 2023-11-06 DIAGNOSIS — M25.461 PAIN AND SWELLING OF RIGHT KNEE: Primary | ICD-10-CM

## 2023-11-06 DIAGNOSIS — M25.561 PAIN AND SWELLING OF RIGHT KNEE: Primary | ICD-10-CM

## 2023-11-06 PROCEDURE — 99213 OFFICE O/P EST LOW 20 MIN: CPT | Performed by: PHYSICIAN ASSISTANT

## 2023-11-06 RX ORDER — PREDNISONE 10 MG/1
TABLET ORAL
Qty: 21 EACH | Refills: 0 | Status: SHIPPED | OUTPATIENT
Start: 2023-11-06 | End: 2023-11-13

## 2023-11-06 NOTE — PROGRESS NOTES
"Subjective   Moreno Dinero is a 53 y.o. male.     Pain       Pt presents for follow up for right knee pain and swelling, started over the past two weeks. No specific injury    Last evaluated on 10/30 and has been off of work since that time   Initial improvement with steroid course but still having symptoms. Tramadol prn for pain   Has hx with this knee with swelling and pain. MRI in 2017 showed arthritis and baker's cyst. No tears. Questionable gout hx     The following portions of the patient's history were reviewed and updated as appropriate: allergies, current medications, past family history, past medical history, past social history, past surgical history, and problem list.    Review of Systems  As noted per HPI     Objective   Blood pressure 126/84, pulse 92, temperature 98.9 °F (37.2 °C), resp. rate 18, height 177.8 cm (70\"), weight 93.2 kg (205 lb 8 oz), SpO2 98%.    Physical Exam  Vitals and nursing note reviewed.   Constitutional:       Appearance: Normal appearance.   Cardiovascular:      Rate and Rhythm: Normal rate and regular rhythm.   Pulmonary:      Effort: Pulmonary effort is normal.   Musculoskeletal:      Right knee: Swelling and bony tenderness present. Decreased range of motion. Tenderness present over the lateral joint line.   Skin:     General: Skin is warm and dry.   Neurological:      Mental Status: He is alert and oriented to person, place, and time.   Psychiatric:         Mood and Affect: Mood normal.         Behavior: Behavior normal.         Assessment & Plan   Diagnoses and all orders for this visit:    1. Pain and swelling of right knee (Primary)  -     Ambulatory Referral to Orthopedic Surgery  -     Ambulatory Referral to Physical Therapy Evaluate and treat  -     XR Knee 1 or 2 View Right    Additional course of steroids sent to pharmacy   Work note for this week as well. Okay for STD if he proceeds with this   If not improving, proceed with XR and referrals as noted. Pt voiced " understanding

## 2023-11-09 ENCOUNTER — TELEPHONE (OUTPATIENT)
Dept: FAMILY MEDICINE CLINIC | Facility: CLINIC | Age: 53
End: 2023-11-09

## 2023-11-09 NOTE — TELEPHONE ENCOUNTER
Patient has XR order already in place he may go ahead and obtained, and I have already started the process of getting him in with ortho and physical therapy per our visit this week

## 2023-11-09 NOTE — TELEPHONE ENCOUNTER
Caller: Marie Dinero    Relationship: Emergency Contact    Best call back number: [phone]     What is the best time to reach you: ANYTIME    Who are you requesting to speak with (clinical staff, provider,  specific staff member): TONIO RIVERO    What was the call regarding: PATIENT IS STILL HAVING KNEE PAIN. PATIENT'S WIFE WOULD LIKE TO DISCUSS WHAT THE NEXT STEPS WILL BE.

## 2023-11-10 ENCOUNTER — TELEPHONE (OUTPATIENT)
Dept: FAMILY MEDICINE CLINIC | Facility: CLINIC | Age: 53
End: 2023-11-10
Payer: COMMERCIAL

## 2023-11-10 NOTE — TELEPHONE ENCOUNTER
Caller: Marie Dinero    Relationship: Emergency Contact    Best call back number:[phone]     What form or medical record are you requesting: LETTER FOR LIGHT DUTY, AND MAYBE SHORT TERM DISABILITY, DOCTOR'S EXCUSE IS NEEDED IF NOT FINISHED ON 11/10/23      Who is requesting this form or medical record from you: PATIENT'S WIFE    How would you like to receive the form or medical records (pick-up, mail, fax):     Timeframe paperwork needed:  AS SOON AS POSSIBLE    Additional notes: PLEASE CALL WHEN READY.

## 2023-11-13 ENCOUNTER — TELEPHONE (OUTPATIENT)
Dept: FAMILY MEDICINE CLINIC | Facility: CLINIC | Age: 53
End: 2023-11-13
Payer: COMMERCIAL

## 2023-11-13 ENCOUNTER — LAB (OUTPATIENT)
Dept: LAB | Facility: HOSPITAL | Age: 53
End: 2023-11-13
Payer: COMMERCIAL

## 2023-11-13 ENCOUNTER — OFFICE VISIT (OUTPATIENT)
Age: 53
End: 2023-11-13
Payer: COMMERCIAL

## 2023-11-13 VITALS
SYSTOLIC BLOOD PRESSURE: 143 MMHG | HEIGHT: 70 IN | DIASTOLIC BLOOD PRESSURE: 80 MMHG | BODY MASS INDEX: 29.35 KG/M2 | WEIGHT: 205 LBS

## 2023-11-13 DIAGNOSIS — M17.11 PRIMARY OSTEOARTHRITIS OF RIGHT KNEE: ICD-10-CM

## 2023-11-13 DIAGNOSIS — M25.461 EFFUSION OF RIGHT KNEE: ICD-10-CM

## 2023-11-13 DIAGNOSIS — M25.561 RIGHT KNEE PAIN, UNSPECIFIED CHRONICITY: Primary | ICD-10-CM

## 2023-11-13 DIAGNOSIS — M71.21 SYNOVIAL CYST OF RIGHT POPLITEAL SPACE: ICD-10-CM

## 2023-11-13 PROCEDURE — 89060 EXAM SYNOVIAL FLUID CRYSTALS: CPT

## 2023-11-13 PROCEDURE — 20611 DRAIN/INJ JOINT/BURSA W/US: CPT | Performed by: STUDENT IN AN ORGANIZED HEALTH CARE EDUCATION/TRAINING PROGRAM

## 2023-11-13 PROCEDURE — 99204 OFFICE O/P NEW MOD 45 MIN: CPT | Performed by: STUDENT IN AN ORGANIZED HEALTH CARE EDUCATION/TRAINING PROGRAM

## 2023-11-13 RX ORDER — TRIAMCINOLONE ACETONIDE 40 MG/ML
80 INJECTION, SUSPENSION INTRA-ARTICULAR; INTRAMUSCULAR
Status: COMPLETED | OUTPATIENT
Start: 2023-11-13 | End: 2023-11-13

## 2023-11-13 RX ORDER — LIDOCAINE HYDROCHLORIDE 10 MG/ML
4 INJECTION, SOLUTION EPIDURAL; INFILTRATION; INTRACAUDAL; PERINEURAL
Status: COMPLETED | OUTPATIENT
Start: 2023-11-13 | End: 2023-11-13

## 2023-11-13 RX ORDER — BUPIVACAINE HYDROCHLORIDE 5 MG/ML
4 INJECTION, SOLUTION EPIDURAL; INTRACAUDAL
Status: COMPLETED | OUTPATIENT
Start: 2023-11-13 | End: 2023-11-13

## 2023-11-13 RX ORDER — IBUPROFEN 800 MG/1
800 TABLET ORAL EVERY 6 HOURS PRN
COMMUNITY

## 2023-11-13 RX ADMIN — TRIAMCINOLONE ACETONIDE 80 MG: 40 INJECTION, SUSPENSION INTRA-ARTICULAR; INTRAMUSCULAR at 15:29

## 2023-11-13 RX ADMIN — BUPIVACAINE HYDROCHLORIDE 4 ML: 5 INJECTION, SOLUTION EPIDURAL; INTRACAUDAL at 15:29

## 2023-11-13 RX ADMIN — LIDOCAINE HYDROCHLORIDE 4 ML: 10 INJECTION, SOLUTION EPIDURAL; INFILTRATION; INTRACAUDAL; PERINEURAL at 15:29

## 2023-11-13 NOTE — TELEPHONE ENCOUNTER
Ortho tried to reach him three times and were unable to contact. He is able to call them at [phone] to schedule. Referral for PT was sent to [facility] and unable to reach, voicemail left. He is able to call them to schedule at [phone].

## 2023-11-13 NOTE — PROGRESS NOTES
Procedure   - Large Joint Arthrocentesis: R knee on 11/13/2023 3:29 PM  Indications: pain  Details: 21 G needle, ultrasound-guided anterolateral approach  Medications: 4 mL bupivacaine (PF) 0.5 %; 4 mL lidocaine PF 1% 1 %; 80 mg triamcinolone acetonide 40 MG/ML  Aspirate: 37 mL yellow and clear; sent for lab analysis  Outcome: tolerated well, no immediate complications  Procedure, treatment alternatives, risks and benefits explained, specific risks discussed. Consent was given by the patient. Immediately prior to procedure a time out was called to verify the correct patient, procedure, equipment, support staff and site/side marked as required. Patient was prepped and draped in the usual sterile fashion.

## 2023-11-13 NOTE — PROGRESS NOTES
[facility] [place] [facility] Office Visit     Office Visit       Date: 11/13/2023   Patient Name: Moreno Dinero  MRN: [id number]  YOB: 1970    Referring Physician: Rush Rivera PA     Chief Complaint:   Chief Complaint   Patient presents with    Right Knee - Pain       History of Present Illness:   Moreno Dinero is a 53 y.o. male who presents with right knee pain for 2 week(s). Onset atraumatic and gradual in nature. Pain is localized to the anterior knee and is a 3/10 on the pain scale. Pain is described as aching. Associated symptoms include pain, swelling, and stiffness. The pain is worse with walking, rising from seated position, and any movement of the joint; resting, ice, and lying down make it better. Previous treatments have included: oral steroids for 3 months duration or longer. Although some transient relief was reported with these interventions, these conservative measures have failed and symptoms have persisted. The patient is limited in daily activities and has had a significant decrease in quality of life as a result. He denies fevers, chills, or constitutional symptoms.    Subjective   Review of Systems: Review of Systems   Constitutional:  Negative for chills, fever, unexpected weight gain and unexpected weight loss.   HENT:  Negative for congestion, postnasal drip and rhinorrhea.    Eyes:  Negative for blurred vision.   Respiratory:  Negative for shortness of breath.    Cardiovascular:  Negative for leg swelling.   Gastrointestinal:  Negative for abdominal pain, nausea and vomiting.   Genitourinary:  Negative for difficulty urinating.   Musculoskeletal:  Positive for arthralgias. Negative for gait problem, joint swelling and myalgias.   Skin:  Negative for skin lesions and wound.   Neurological:  Negative for dizziness, weakness, light-headedness and numbness.   Hematological:  Does not bruise/bleed easily.   Psychiatric/Behavioral:   Please get updated weight today    Thx    RP "Negative for depressed mood.         I have reviewed the following portions of the patient's history:History of Present Illness and review of systems.    Past Medical History: History reviewed. No pertinent past medical history.    Past Surgical History:   Past Surgical History:   Procedure Laterality Date    ARM TENDON REPAIR Left     CHOLECYSTECTOMY      HERNIA REPAIR         Family History:   Family History   Problem Relation Age of Onset    Hypertension Mother        Social History:   Social History     Socioeconomic History    Marital status:    Tobacco Use    Smoking status: Some Days     Types: Cigarettes    Smokeless tobacco: Never   Substance and Sexual Activity    Alcohol use: Yes     Comment: occasionally    Drug use: No       Medications:   Current Outpatient Medications:     ibuprofen (ADVIL,MOTRIN) 800 MG tablet, Take 1 tablet by mouth Every 6 (Six) Hours As Needed for Mild Pain., Disp: , Rfl:     Allergies:   Allergies   Allergen Reactions    Hydrocodone Irritability       I reviewed the patient's chief complaint, history of present illness, review of systems, past medical history, surgical history, family history, social history, medications and allergy list.     Objective    Vital Signs:   Vitals:    11/13/23 1442   BP: 143/80   Weight: 93 kg (205 lb)   Height: 177.8 cm (70\")     Body mass index is 29.41 kg/m². BMI is >= 25 and <30. (Overweight) The following options were offered after discussion;: exercise counseling/recommendations and nutrition counseling/recommendations      In this visit the patient was advised to stop smoking and was offered tobacco cessation measures and resources, including NRT and/or medication intervention. At least 5 minutes was spent on face-to-face counseling regarding smoking cessation.     Ortho Exam:  Constitutional: General Appearance: healthy-appearing, NAD, and normal body habitus.   Gait and Station: Appearance: limp and antalgic gait and ambulates with no " assitive devices.   Skin: Right Lower Extremity: normal. Left Lower Extremity: normal.   Psychiatric: Orientation: oriented to time, place, and person. Mood and Affect: normal mood and affect and active and alert.   Right knee exam:   There is swelling and effusion but no warmth or erythema of the knee.    The skin is clear.    Overall the alignment of the knee is varus   The patient has 5/5 strength with ankle plantar flexion, dorsiflexion, inversion, eversion, and great toe extension.    Sensation is grossly present to light touch in the superficial peroneal, deep peroneal, and tibial nerve distributions.    The dorsalis pedis pulse is 2+ and the foot is well perfused with brisk capillary refill.   Active ROM is 0° to 110°.   Passive ROM is 0° to 110°.   The knee shows no gross instability to varus/valgus stress testing, anterior/posterior drawer sign, and Lachman testing.    There is tenderness to palpation over the medial and lateral joint line. Patellar grind test is positive.   Hip ROM is full and painless.  Left knee exam:   Normal knee contour.   No evidence of swelling or effusion. Full range of motion.    Results Review:   Imaging Results (Last 24 Hours)       Procedure Component Value Units Date/Time    US Guided Injection [[id number]] Resulted: 11/13/23 1535     Updated: 11/13/23 1601        I personally reviewed the radiographs of the right knee from 11/12/2023.  No acute fracture or dislocation.  Mild degenerative changes noted in all 3 joint compartments of the knee.    Procedures    Assessment / Plan    Assessment/Plan:   Diagnoses and all orders for this visit:    1. Right knee pain, unspecified chronicity (Primary)  -     US Guided Injection    2. Primary osteoarthritis of right knee  -     MRI Knee Right Without Contrast; Future  -     Crystal Exam, Fluid - Synovial Fluid,; Future    3. Effusion of right knee  -     MRI Knee Right Without Contrast; Future  -     Crystal Exam, Fluid - Synovial  Fluid,; Future    4. Synovial cyst of right popliteal space    Other orders  -     - Large Joint Arthrocentesis: R knee    Large swollen knee without any clear known mechanism of injury.  He was playing outside with his dog 1 day without injury.  He went to sleep and woke up the next morning with a large swollen knee.  Prednisone has alleviated some of his pain but not significantly reduced his swelling.  He has not been returned to work.  He has limited range of motion of the knee.  Radiographs of the right knee show mild degenerative changes in all compartments of the knee.  Does have a remote history of gout and I am concerned for pseudogout in the knee.  He also has a Baker's cyst and has had this in the past as well.  Today, I recommend that we aspirate the knee and send for crystal analysis.  I also inject the knee with corticosteroid to help with pain and swelling.  Would also obtain MRI of the right knee as long as the crystal analysis is negative.  Risk and benefits this procedure were discussed and he is elected to proceed.  Tolerated the procedure well.  See procedure note.  He will follow-up once the imaging study is completed to review the results.    Previous documentation reviewed: 11/6/2023-office visit-[facility].    Previous imaging studies reviewed: 11/12/2023-radiographs of the right knee.  6/28/2017-MRI right knee.    Follow Up:   Return for MRI RIGHT KNEE REVIEW.      Anjum Savage MD  [facility] [place] [facility]

## 2023-11-13 NOTE — TELEPHONE ENCOUNTER
Need XR results from [place] if he went and got those last week. Get update on patient's symptoms- he is probably looking at Short term disability where he has been off the past couple of weeks

## 2023-11-14 ENCOUNTER — TELEPHONE (OUTPATIENT)
Age: 53
End: 2023-11-14
Payer: COMMERCIAL

## 2023-11-14 LAB — CRYSTALS FLD MICRO: NORMAL

## 2023-11-14 NOTE — PROGRESS NOTES
Please let him know that crystals were noted. What we did yesterday should get him better soon. We need to cancel the MRI.

## 2023-11-14 NOTE — TELEPHONE ENCOUNTER
"Spoke to pt and gave him Dr. 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 Pt verbalized understanding that once the cortisone injection \"kicks in\", if patient feels he can return to work sooner then we can provide him with a note for work for clearance. Pt will hold off on PT until he feels that he is able to tolerate it. Will contact us for that referral if needed before 3 wk f.u appt. Appt scheduled for Monday 12/4.    No further action needed at this time.    Piper RODRIGUEZ CMA ([facility]), ROT   "

## 2023-11-14 NOTE — PROGRESS NOTES
Shot may take a few days to full kick in. As it does, he should start walking and using his knee. Once he feels like he can work, let me know and I can give him clearance. I still want to see him in 3 weeks but am willing to clear him before then if he feels ready.

## 2023-11-14 NOTE — TELEPHONE ENCOUNTER
----- Message from David Savage MD sent at 11/14/2023  9:21 AM EST -----  Shot may take a few days to full kick in. As it does, he should start walking and using his knee. Once he feels like he can work, let me know and I can give him clearance. I still want to see him in 3 weeks but am willing to clear him before then if   he feels ready.

## 2023-11-20 ENCOUNTER — TELEPHONE (OUTPATIENT)
Age: 53
End: 2023-11-20
Payer: COMMERCIAL

## 2023-11-20 ENCOUNTER — TELEPHONE (OUTPATIENT)
Dept: ORTHOPEDIC SURGERY | Facility: CLINIC | Age: 53
End: 2023-11-20
Payer: COMMERCIAL

## 2023-11-20 NOTE — TELEPHONE ENCOUNTER
Lvm to call our office back had questions about [place] paperwork. Can ask to speak with Tangela when calls back.     Tangela Boudreaux

## 2023-11-20 NOTE — TELEPHONE ENCOUNTER
HUB AGENT ATTEMPTED WARM TRANSFER - NO ANSWER     PATIENT RETURNED MISSED CALL FROM MIRA BARTHOLOMEW (MIRA BHATT) PER ALREADY SIGNED TEL ENC TODAY 11-20-23 @ 0771     PLEASE CALL BACK / LEAVE VMAIL x 2 TO DISCUSS OFC QUESTIONS re: [place] PAPERWORK     THANKS

## 2023-11-21 NOTE — TELEPHONE ENCOUNTER
I spoke with the patient he did state he needed the paperwork filled out. I let him know we will get it filled out and sent in for him. The patient verbalized understanding.     Tangela Boudreaux

## 2023-12-04 ENCOUNTER — OFFICE VISIT (OUTPATIENT)
Age: 53
End: 2023-12-04
Payer: COMMERCIAL

## 2023-12-04 VITALS
DIASTOLIC BLOOD PRESSURE: 82 MMHG | BODY MASS INDEX: 29.35 KG/M2 | SYSTOLIC BLOOD PRESSURE: 140 MMHG | HEIGHT: 70 IN | WEIGHT: 205.03 LBS

## 2023-12-04 DIAGNOSIS — M17.11 PRIMARY OSTEOARTHRITIS OF RIGHT KNEE: ICD-10-CM

## 2023-12-04 DIAGNOSIS — M10.9 ACUTE GOUT OF RIGHT KNEE, UNSPECIFIED CAUSE: Primary | ICD-10-CM

## 2023-12-04 PROCEDURE — 99213 OFFICE O/P EST LOW 20 MIN: CPT | Performed by: STUDENT IN AN ORGANIZED HEALTH CARE EDUCATION/TRAINING PROGRAM

## 2023-12-04 NOTE — PROGRESS NOTES
[facility] [place] [facility] Follow Up Visit     Office Follow Up      Date: 12/04/2023   Patient Name: Moreno Dinero  MRN: [id number]  YOB: 1970    Referring Physician: No ref. provider found     Chief Complaint:   Chief Complaint   Patient presents with    Follow-up     3 week follow up;  Right knee pain, unspecified chronicity       History of Present Illness: Moreno Dinero is a 53 y.o. male who is here today for follow up on right knee pain from osteoarthritis and aspiration confirmed uric acid crystals.  Pain is improved after corticosteroid injection.  He now rates it 2/10.  He still has difficulty getting onto his knees and would not be able to climb a ladder.    Subjective   Review of Systems: Review of Systems   Constitutional:  Negative for chills, fever, unexpected weight gain and unexpected weight loss.   HENT:  Negative for congestion, postnasal drip and rhinorrhea.    Eyes:  Negative for blurred vision.   Respiratory:  Negative for shortness of breath.    Cardiovascular:  Negative for leg swelling.   Gastrointestinal:  Negative for abdominal pain, nausea and vomiting.   Genitourinary:  Negative for difficulty urinating.   Musculoskeletal:  Positive for arthralgias. Negative for gait problem, joint swelling and myalgias.   Skin:  Negative for skin lesions and wound.   Neurological:  Negative for dizziness, weakness, light-headedness and numbness.   Hematological:  Does not bruise/bleed easily.   Psychiatric/Behavioral:  Negative for depressed mood.         Medications:   Current Outpatient Medications:     ibuprofen (ADVIL,MOTRIN) 800 MG tablet, Take 1 tablet by mouth Every 6 (Six) Hours As Needed for Mild Pain. (Patient not taking: Reported on 12/4/2023), Disp: , Rfl:     Allergies:   Allergies   Allergen Reactions    Hydrocodone Irritability       I have reviewed and updated the patient's chief complaint, history of present illness, review  "of systems, past medical history, surgical history, family history, social history, medications and allergy list as appropriate.     Objective    Vital Signs:   Vitals:    12/04/23 1302   BP: 140/82   Weight: 93 kg (205 lb 0.4 oz)   Height: 177.8 cm (70\")     Body mass index is 29.42 kg/m².  BMI is >= 25 and <30. (Overweight) The following options were offered after discussion;: exercise counseling/recommendations and nutrition counseling/recommendations      In this visit the patient was advised to stop smoking and was offered tobacco cessation measures and resources, including NRT and/or medication intervention. At least 3 minutes was spent on face-to-face counseling regarding smoking cessation.     Ortho Exam:  Right knee: No erythema, ecchymosis, swelling.  Tender to palpation over the anterior knee.  Full range of motion in flexion extension but pain is experienced with deep knee flexion.  She can get to 0 degrees extension, 130 degrees in flexion.  Stable to varus and valgus stress.  Negative anterior posterior drawer.  Negative Randy's.  Sensation intact to light touch.  5/5 strength.  2+ posterior tibial pulse.    Results Review:   Imaging Results (Last 24 Hours)       ** No results found for the last 24 hours. **            Procedures    Assessment / Plan    Assessment/Plan:   Diagnoses and all orders for this visit:    1. Acute gout of right knee, unspecified cause (Primary)  -     Ambulatory Referral to Physical Therapy Evaluate and treat, Ortho; Electrotherapy, Heat; Phonophoresis, Moist heat, Ultrasound; Tens (Home), Iontophoresis, E-stim; Cross Fiber, Desensitization, Soft Tissue Mobilizaton; Strengthening (work conditio...    2. Primary osteoarthritis of right knee  -     Ambulatory Referral to Physical Therapy Evaluate and treat, Ortho; Electrotherapy, Heat; Phonophoresis, Moist heat, Ultrasound; Tens (Home), Iontophoresis, E-stim; Cross Fiber, Desensitization, Soft Tissue Mobilizaton; " Strengthening (work conditio...    Follow-up on right knee pain.  Radiographs at last visit showed mild degenerative changes.  Aspiration revealed uric acid crystals.  Corticosteroid injection has improved symptoms though he still has some mild pain.  He has been on short-term disability and that is scheduled to and next week.  He still has difficulty getting onto his knees and climbing ladders.  I recommended a course of physical therapy and provide him with a referral today.  We will extend his leave another 1 week with a schedule return on 12/18/2023.  He will call back if his symptoms do not improve.    Follow Up:   No follow-ups on file.      Anjum Savage MD  [facility] [place] [facility]

## 2024-02-26 ENCOUNTER — TELEPHONE (OUTPATIENT)
Dept: FAMILY MEDICINE CLINIC | Facility: CLINIC | Age: 54
End: 2024-02-26
Payer: COMMERCIAL

## 2024-02-26 NOTE — TELEPHONE ENCOUNTER
Caller: DineroAlicewilmer    Relationship: Emergency Contact    Best call back number: [phone]     What medication are you requesting: PREDNISONE     What are your current symptoms: KNEE PROBLEM    How long have you been experiencing symptoms: 2 DAYS    Have you had these symptoms before:    [x] Yes  [] No    Have you been treated for these symptoms before:   [x] Yes  [] No    If a prescription is needed, what is your preferred pharmacy and phone number: [id number] [address] [phone] [facility] [phone] FX     Additional notes: THE PATIENT'S WIFE SAID THAT DR. JUNE PRESCRIBES THIS WHEN HE HAS A FLAIR UP. PLEASE LET HER KNOW IF THERE ARE ANY ISSUES WITH THIS REQUEST.

## 2024-02-26 NOTE — TELEPHONE ENCOUNTER
Please call.  He would need to be seen for this.  I have not seen him since 2021.  He has  seen Rush Rivera but I have not seen him since 2021.    Okay to work and with any available provider this week for check.

## 2024-02-27 ENCOUNTER — OFFICE VISIT (OUTPATIENT)
Dept: FAMILY MEDICINE CLINIC | Facility: CLINIC | Age: 54
End: 2024-02-27
Payer: COMMERCIAL

## 2024-02-27 VITALS
HEIGHT: 70 IN | WEIGHT: 207 LBS | SYSTOLIC BLOOD PRESSURE: 126 MMHG | RESPIRATION RATE: 18 BRPM | DIASTOLIC BLOOD PRESSURE: 80 MMHG | TEMPERATURE: 97.5 F | BODY MASS INDEX: 29.63 KG/M2 | HEART RATE: 78 BPM

## 2024-02-27 DIAGNOSIS — M10.071 ACUTE IDIOPATHIC GOUT OF RIGHT ANKLE: ICD-10-CM

## 2024-02-27 DIAGNOSIS — M25.571 ACUTE RIGHT ANKLE PAIN: Primary | ICD-10-CM

## 2024-02-27 PROCEDURE — 99213 OFFICE O/P EST LOW 20 MIN: CPT | Performed by: FAMILY MEDICINE

## 2024-02-27 RX ORDER — TRAMADOL HYDROCHLORIDE 50 MG/1
50 TABLET ORAL EVERY 6 HOURS PRN
Qty: 20 TABLET | Refills: 0 | Status: SHIPPED | OUTPATIENT
Start: 2024-02-27

## 2024-02-27 RX ORDER — PREDNISONE 10 MG/1
TABLET ORAL
Qty: 20 TABLET | Refills: 0 | Status: SHIPPED | OUTPATIENT
Start: 2024-02-27

## 2024-02-27 NOTE — PROGRESS NOTES
"Chief Complaint  Foot Swelling (Right ankle swelling. )    Subjective          Moreno Dinero presents to [facility]    History of Present Illness  The patient presents for evaluation of ankle swelling.    His right ankle, big toe, and foot are messed up. He woke up on Sunday morning and it was tender. He stayed off of it and stayed in bed. He worked yesterday and when he got home, it was worse and started to swell. He put ice and heat on it, which did not help. He tried to elevate it. It hurts when he tries to move it. He denies any fall or injury. His right big toe is not as painful as it was a week ago. His toe was stiff and would not bend. It is tender and warm. He has throbbing pain. He does not drink beer. He had gout in 11/2023.   He saw Dr. Savage in 11/2023 for pain and swelling in the right knee and had 37 mL of fluid aspirated.        OTHER NOTES:  Had increased red meat this past weekend, hamburger and burrito        Review of Systems   Constitutional:  Negative for fever.   Respiratory: Negative.     Cardiovascular: Negative.    Gastrointestinal: Negative.    Musculoskeletal:  Positive for arthralgias.   All other systems reviewed and are negative.       Objective       Vital Signs:   /80   Pulse 78   Temp 97.5 °F (36.4 °C)   Resp 18   Ht 177.8 cm (70\")   Wt 93.9 kg (207 lb)   BMI 29.70 kg/m²     Physical Exam  Vitals and nursing note reviewed.   Constitutional:       Appearance: He is well-developed.   HENT:      Head: Normocephalic and atraumatic.      Right Ear: External ear normal.      Left Ear: External ear normal.   Eyes:      Pupils: Pupils are equal, round, and reactive to light.   Pulmonary:      Effort: Pulmonary effort is normal.   Musculoskeletal:      Comments: There is significant pain and swelling of the right ankle.  Some mild redness.  Mild warmth.  Decreased range of motion.  Exquisite tenderness with light palpation consistent with " gout.   Skin:     General: Skin is warm and dry.   Neurological:      Mental Status: He is alert.   Psychiatric:         Behavior: Behavior normal.            Physical Exam      Result Review :            Other Results    Results               Assessment and Plan    Diagnoses and all orders for this visit:    1. Acute right ankle pain (Primary)  -     Uric Acid  -     predniSONE (DELTASONE) 10 MG tablet; 4 po x 2 days then 3 po daily x 2 days then 2 po daily x 2 days then 1 po daily x 2 days then stop.  Dispense: 20 tablet; Refill: 0  -     traMADol (ULTRAM) 50 MG tablet; Take 1 tablet by mouth Every 6 (Six) Hours As Needed for Moderate Pain.  Dispense: 20 tablet; Refill: 0    2. Acute idiopathic gout of right ankle  -     Uric Acid  -     predniSONE (DELTASONE) 10 MG tablet; 4 po x 2 days then 3 po daily x 2 days then 2 po daily x 2 days then 1 po daily x 2 days then stop.  Dispense: 20 tablet; Refill: 0  -     traMADol (ULTRAM) 50 MG tablet; Take 1 tablet by mouth Every 6 (Six) Hours As Needed for Moderate Pain.  Dispense: 20 tablet; Refill: 0               DISCUSSION    Assessment & Plan  1. Right ankle pain.  This weekend, he does report that he did eat a lot of red meat this past weekend. He had a right knee swelling back in 11/2023 and had aspirated and showed uric acid crystals. I suspect that he has gout. I will start prednisone taper as noted. Tramadol as needed for pain. He should be off work this week. Further plan once labs are back. If uric acid is elevated, then we will need to start him on allopurinol once he is better. He will call back sooner if worsening.    MIGUEL report was not available.  I believe the medical necessity for and safety in prescribing the controlled substance substantially outweighs the risk of unlawful use or diversion of the controlled substance. .     Follow Up   Return for follow up depends on review of labs and testing.    Patient was given instructions and counseling  regarding his condition or for health maintenance advice. Please see specific information pulled into the AVS if appropriate.       Jesse Power MD    Patient or patient representative verbalized consent for the use of Ambient Listening during the visit with  Jesse Power MD for chart documentation. 2/27/2024  14:11 EST

## 2024-02-28 LAB — URATE SERPL-MCNC: 6.1 MG/DL (ref 3.8–8.4)

## 2024-03-01 DIAGNOSIS — M10.071 ACUTE IDIOPATHIC GOUT OF RIGHT ANKLE: Primary | ICD-10-CM

## 2024-03-01 RX ORDER — ALLOPURINOL 100 MG/1
100 TABLET ORAL DAILY
Qty: 30 TABLET | Refills: 5 | Status: SHIPPED | OUTPATIENT
Start: 2024-03-01

## 2024-05-22 ENCOUNTER — OFFICE VISIT (OUTPATIENT)
Dept: FAMILY MEDICINE CLINIC | Facility: CLINIC | Age: 54
End: 2024-05-22
Payer: COMMERCIAL

## 2024-05-22 ENCOUNTER — HOSPITAL ENCOUNTER (OUTPATIENT)
Facility: HOSPITAL | Age: 54
Discharge: HOME | End: 2024-05-22
Admitting: PHYSICIAN ASSISTANT
Payer: COMMERCIAL

## 2024-05-22 VITALS
OXYGEN SATURATION: 96 % | DIASTOLIC BLOOD PRESSURE: 78 MMHG | HEART RATE: 82 BPM | WEIGHT: 214 LBS | BODY MASS INDEX: 30.64 KG/M2 | HEIGHT: 70 IN | TEMPERATURE: 97.3 F | SYSTOLIC BLOOD PRESSURE: 132 MMHG

## 2024-05-22 DIAGNOSIS — R82.90 ABNORMAL URINE ODOR: ICD-10-CM

## 2024-05-22 DIAGNOSIS — Z87.442 HISTORY OF KIDNEY STONES: ICD-10-CM

## 2024-05-22 DIAGNOSIS — R10.31 ABDOMINAL PAIN, RLQ: Primary | ICD-10-CM

## 2024-05-22 DIAGNOSIS — L03.311 CELLULITIS OF ABDOMINAL WALL: ICD-10-CM

## 2024-05-22 LAB
BILIRUB BLD-MCNC: NEGATIVE MG/DL
CLARITY, POC: CLEAR
COLOR UR: YELLOW
EXPIRATION DATE: ABNORMAL
GLUCOSE UR STRIP-MCNC: NEGATIVE MG/DL
KETONES UR QL: NEGATIVE
LEUKOCYTE EST, POC: ABNORMAL
Lab: ABNORMAL
NITRITE UR-MCNC: NEGATIVE MG/ML
PH UR: 6 [PH] (ref 5–8)
PROT UR STRIP-MCNC: NEGATIVE MG/DL
RBC # UR STRIP: NEGATIVE /UL
SP GR UR: 1.02 (ref 1–1.03)
UROBILINOGEN UR QL: NORMAL

## 2024-05-22 PROCEDURE — 99214 OFFICE O/P EST MOD 30 MIN: CPT | Performed by: PHYSICIAN ASSISTANT

## 2024-05-22 PROCEDURE — 81003 URINALYSIS AUTO W/O SCOPE: CPT | Performed by: PHYSICIAN ASSISTANT

## 2024-05-22 PROCEDURE — 25510000001 IOPAMIDOL 61 % SOLUTION: Performed by: PHYSICIAN ASSISTANT

## 2024-05-22 PROCEDURE — 74178 CT ABD&PLV WO CNTR FLWD CNTR: CPT

## 2024-05-22 RX ORDER — DOXYCYCLINE HYCLATE 100 MG/1
100 CAPSULE ORAL 2 TIMES DAILY
Qty: 20 CAPSULE | Refills: 0 | Status: SHIPPED | OUTPATIENT
Start: 2024-05-22

## 2024-05-22 RX ADMIN — IOPAMIDOL 90 ML: 612 INJECTION, SOLUTION INTRAVENOUS at 15:26

## 2024-05-22 NOTE — PROGRESS NOTES
"Subjective   Moreno Dinero is a 53 y.o. male.   Chief Complaint   Patient presents with    Abdominal Pain     Rt side low abdominal pain with movement. Needs work excuse also. No injury's known.      History of Present Illness   Pt presents with CC of RLQ pain    Last kidney stone 4 years. Denies any recent flank pain. No urinary symptoms other than recently noticing a bad urine odor    No nausea. 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 Has noticed bending, crouching, crawling activities can exacerbated discomfort especially when going to stand back up     No fever or chills     No bulge he has noticed     Took ibuprofen which seemed to help, but pain worse as day went on     Had to call into work today due to pain         The following portions of the patient's history were reviewed and updated as appropriate: allergies, current medications, past family history, past medical history, past social history, past surgical history, and problem list.    Review of Systems  As noted per HPI     Objective   Blood pressure 132/78, pulse 82, temperature 97.3 °F (36.3 °C), height 177.8 cm (70\"), weight 97.1 kg (214 lb), SpO2 96%.     Physical Exam  Vitals and nursing note reviewed.   Constitutional:       Appearance: Normal appearance.   HENT:      Head: Normocephalic.   Cardiovascular:      Rate and Rhythm: Normal rate and regular rhythm.   Pulmonary:      Effort: Pulmonary effort is normal.      Breath sounds: Normal breath sounds.   Abdominal:      General: Abdomen is flat. Bowel sounds are normal.      Tenderness:  in the right lower quadrant There is guarding.       Skin:     General: Skin is warm and dry.   Neurological:      Mental Status: He is alert and oriented to person, place, and time.         Assessment & Plan   Diagnoses and all orders for this visit:    1. Abdominal pain, RLQ (Primary)  -     " CT Abdomen Pelvis With & Without Contrast    2. Abnormal urine odor  -     POCT urinalysis dipstick, automated  -     Urine Culture - Urine, Urine, Clean Catch    3. Cellulitis of abdominal wall  -     doxycycline (VIBRAMYCIN) 100 MG capsule; Take 1 capsule by mouth 2 (Two) Times a Day.  Dispense: 20 capsule; Refill: 0    4. History of kidney stones  -     CT Abdomen Pelvis With & Without Contrast    Proceed with CT abdomen and pelvis   UA looked okay other than trace leuks. Will send for culture   Incidental finding of area of localized cellulitis with streaking, possible from insect bite. Area of redness not the same area of tenderness patient reports. Cover with doxycycline pending CT results.

## 2024-05-27 LAB
BACTERIA UR CULT: ABNORMAL
BACTERIA UR CULT: ABNORMAL
OTHER ANTIBIOTIC SUSC ISLT: ABNORMAL

## 2024-05-28 DIAGNOSIS — N39.0 ACUTE UTI: Primary | ICD-10-CM

## 2024-05-28 DIAGNOSIS — M87.059 AVASCULAR NECROSIS OF BONE OF HIP, UNSPECIFIED LATERALITY: Primary | ICD-10-CM

## 2024-05-28 RX ORDER — CIPROFLOXACIN 500 MG/1
500 TABLET, FILM COATED ORAL 2 TIMES DAILY
Qty: 14 TABLET | Refills: 0 | Status: SHIPPED | OUTPATIENT
Start: 2024-05-28

## 2024-06-12 ENCOUNTER — OFFICE VISIT (OUTPATIENT)
Age: 54
End: 2024-06-12
Payer: COMMERCIAL

## 2024-06-12 VITALS
DIASTOLIC BLOOD PRESSURE: 98 MMHG | HEIGHT: 70 IN | WEIGHT: 212.9 LBS | SYSTOLIC BLOOD PRESSURE: 160 MMHG | BODY MASS INDEX: 30.48 KG/M2

## 2024-06-12 DIAGNOSIS — M25.551 RIGHT HIP PAIN: Primary | ICD-10-CM

## 2024-06-12 DIAGNOSIS — M87.051 AVASCULAR NECROSIS OF BONE OF HIP, RIGHT: ICD-10-CM

## 2024-06-12 DIAGNOSIS — M16.11 ARTHRITIS OF RIGHT HIP: ICD-10-CM
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
## 2024-07-09 ENCOUNTER — OFFICE VISIT (OUTPATIENT)
Dept: FAMILY MEDICINE CLINIC | Facility: CLINIC | Age: 54
End: 2024-07-09
Payer: COMMERCIAL

## 2024-07-09 VITALS
TEMPERATURE: 98.9 F | BODY MASS INDEX: 30.35 KG/M2 | HEIGHT: 70 IN | OXYGEN SATURATION: 96 % | HEART RATE: 98 BPM | DIASTOLIC BLOOD PRESSURE: 76 MMHG | WEIGHT: 212 LBS | SYSTOLIC BLOOD PRESSURE: 122 MMHG

## 2024-07-09 DIAGNOSIS — S39.011A STRAIN OF ABDOMINAL MUSCLE, INITIAL ENCOUNTER: ICD-10-CM

## 2024-07-09 DIAGNOSIS — Z12.11 SCREENING FOR MALIGNANT NEOPLASM OF COLON: ICD-10-CM

## 2024-07-09 DIAGNOSIS — R10.31 ABDOMINAL PAIN, RLQ: Primary | ICD-10-CM

## 2024-07-09 LAB
BILIRUB BLD-MCNC: NEGATIVE MG/DL
CLARITY, POC: CLEAR
COLOR UR: YELLOW
EXPIRATION DATE: ABNORMAL
GLUCOSE UR STRIP-MCNC: NEGATIVE MG/DL
KETONES UR QL: NEGATIVE
LEUKOCYTE EST, POC: NEGATIVE
Lab: ABNORMAL
NITRITE UR-MCNC: NEGATIVE MG/ML
PH UR: 5.5 [PH] (ref 5–8)
PROT UR STRIP-MCNC: ABNORMAL MG/DL
RBC # UR STRIP: NEGATIVE /UL
SP GR UR: 1.03 (ref 1–1.03)
UROBILINOGEN UR QL: NORMAL

## 2024-07-09 PROCEDURE — 99214 OFFICE O/P EST MOD 30 MIN: CPT | Performed by: PHYSICIAN ASSISTANT

## 2024-07-09 PROCEDURE — 81003 URINALYSIS AUTO W/O SCOPE: CPT | Performed by: PHYSICIAN ASSISTANT

## 2024-07-09 RX ORDER — CYCLOBENZAPRINE HCL 10 MG
10 TABLET ORAL NIGHTLY PRN
Qty: 30 TABLET | Refills: 2 | Status: SHIPPED | OUTPATIENT
Start: 2024-07-09 | End: 2024-07-10 | Stop reason: SDUPTHER

## 2024-07-09 RX ORDER — PREDNISONE 10 MG/1
TABLET ORAL
Qty: 21 EACH | Refills: 0 | Status: SHIPPED | OUTPATIENT
Start: 2024-07-09 | End: 2024-07-10 | Stop reason: SDUPTHER

## 2024-07-10 ENCOUNTER — TELEPHONE (OUTPATIENT)
Dept: FAMILY MEDICINE CLINIC | Facility: CLINIC | Age: 54
End: 2024-07-10
Payer: COMMERCIAL

## 2024-07-10 DIAGNOSIS — S76.211A STRAIN OF GROIN, RIGHT, INITIAL ENCOUNTER: ICD-10-CM

## 2024-07-10 RX ORDER — CYCLOBENZAPRINE HCL 10 MG
10 TABLET ORAL NIGHTLY PRN
Qty: 30 TABLET | Refills: 2 | Status: SHIPPED | OUTPATIENT
Start: 2024-07-10

## 2024-07-10 RX ORDER — PREDNISONE 10 MG/1
TABLET ORAL
Qty: 21 EACH | Refills: 0 | Status: SHIPPED | OUTPATIENT
Start: 2024-07-10

## 2024-07-10 NOTE — PROGRESS NOTES
Pt called and needed to get Prescriptions sent to [facility] ( reordered) in [place]  today[facility]. 7/10

## 2024-07-10 NOTE — TELEPHONE ENCOUNTER
Caller: Marie Dinero    Relationship: Emergency Contact    Best call back number: [phone]    What form or medical record are you requesting: WORK NOTE    Who is requesting this form or medical record from you: WORKPLACE    How would you like to receive the form or medical records (pick-up, mail, fax):     If pick-up, provide patient with address and location details    Timeframe paperwork needed: ASAP    Additional notes: PATIENT WAS SEEN YESTERDAY AND WAS ADVISED HE COULD GET A NOTE TO  IF HE MISSED WORK TODAY, HE NEEDS A NOTE FOR YESTERDAY AS WELL    PLEASE CALL WHEN READY FOR

## 2024-07-11 LAB
BACTERIA UR CULT: NORMAL
BACTERIA UR CULT: NORMAL

## 2024-07-14 NOTE — PROGRESS NOTES
"Subjective   Moreno Dinero is a 53 y.o. male.     Abdominal Pain       Pt presents with CC of right lower abdominal pain   Has had similar issue in the past and had UTI at the time. 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 Notes pain exacerbated by these types of activity and had episode at work with sharp pains that made it hard to stand up straight.   CT at end of May for similar symptoms showed no herniation or obstructive kidney stone (has hx of this) but did show early avascular necrosis of the him in which he has been referred and had consult with ortho who are monitoring   No fever or chills.no change in BM   Pt is overdue for colon cancer screening    The following portions of the patient's history were reviewed and updated as appropriate: allergies, current medications, past family history, past medical history, past social history, past surgical history, and problem list.    Review of Systems   Gastrointestinal:  Positive for abdominal pain.     As noted per HPI     Objective   Blood pressure 122/76, pulse 98, temperature 98.9 °F (37.2 °C), height 177.8 cm (70\"), weight 96.2 kg (212 lb), SpO2 96%.     Physical Exam  Vitals and nursing note reviewed.   Constitutional:       Appearance: Normal appearance.   Cardiovascular:      Rate and Rhythm: Normal rate and regular rhythm.   Pulmonary:      Effort: Pulmonary effort is normal.      Breath sounds: Normal breath sounds.   Abdominal:      General: Abdomen is flat.      Palpations: Abdomen is soft.      Tenderness:  in the right lower quadrant There is no right CVA tenderness or left CVA tenderness.   Neurological:      Mental Status: He is alert.         Assessment & Plan   Diagnoses and all orders for this visit:    1. Abdominal pain, RLQ (Primary)  -     POCT urinalysis dipstick, automated  -     Urine Culture - Urine, Urine, Clean Catch; Future    2. Strain of abdominal muscle, initial " encounter  -    predniSONE (DELTASONE) 10 MG (21) dose pack; Use as directed on package  Dispense: 21 each; Refill: 0  -     cyclobenzaprine (FLEXERIL) 10 MG tablet; Take 1 tablet by mouth At Night As Needed for Muscle Spasms.  Dispense: 30 tablet; Refill: 2    3. Screening for malignant neoplasm of colon  -     Ambulatory Referral For Screening Colonoscopy    UA looked okay. Send for culture to confirm infection from last visit has cleared   Referral for colonoscopy placed   Due to previous work up already completed, suspect symptoms are secondary to muscular strain.   Steroid taper and muscle relaxer as directed. Rest and alternate ice and heat   Okay for work note if needed   Call back if not improving

## 2024-09-17 DIAGNOSIS — M10.071 ACUTE IDIOPATHIC GOUT OF RIGHT ANKLE: ICD-10-CM

## 2024-09-18 RX ORDER — ALLOPURINOL 100 MG/1
100 TABLET ORAL DAILY
Qty: 30 TABLET | Refills: 2 | Status: SHIPPED | OUTPATIENT
Start: 2024-09-18

## 2024-12-12 ENCOUNTER — OFFICE VISIT (OUTPATIENT)
Age: 54
End: 2024-12-12
Payer: COMMERCIAL

## 2024-12-12 VITALS
WEIGHT: 216.8 LBS | SYSTOLIC BLOOD PRESSURE: 150 MMHG | BODY MASS INDEX: 31.04 KG/M2 | HEIGHT: 70 IN | DIASTOLIC BLOOD PRESSURE: 92 MMHG

## 2024-12-12 DIAGNOSIS — M87.051 AVASCULAR NECROSIS OF BONE OF HIP, RIGHT: Primary | ICD-10-CM

## 2024-12-12 DIAGNOSIS — M16.11 ARTHRITIS OF RIGHT HIP: ICD-10-CM

## 2024-12-12 NOTE — PROGRESS NOTES
[facility] Office Follow Up Visit     Office Follow Up      Date: 12/12/2024   Patient Name: Moreno Dinero  MRN: [id number]  YOB: 1970    Referring Physician: Jesse Power MD     Chief Complaint:   Chief Complaint   Patient presents with    Follow-up     6 month follow up-  Avascular necrosis of bone of hip, right     History of Present Illness: Moreno Dinero is a 54 y.o. male who returns to clinic today for follow up on right hip pain. His pain is a 2 /10 on the pain scale. Patient has tried the following previous treatments. He mentions current symptoms of pain  and stiffness. He states that these treatments have stayed the same.    Subjective     Review of Systems: Review of Systems   Constitutional:  Negative for chills, fever, unexpected weight gain and unexpected weight loss.   HENT:  Negative for congestion, postnasal drip and rhinorrhea.    Eyes:  Negative for blurred vision.   Respiratory:  Negative for shortness of breath.    Cardiovascular:  Negative for leg swelling.   Gastrointestinal:  Negative for abdominal pain, nausea and vomiting.   Genitourinary:  Negative for difficulty urinating.   Musculoskeletal:  Positive for arthralgias. Negative for gait problem, joint swelling and myalgias.   Skin:  Negative for skin lesions and wound.   Neurological:  Negative for dizziness, weakness, light-headedness and numbness.   Hematological:  Does not bruise/bleed easily.   Psychiatric/Behavioral:  Negative for depressed mood.         Medications:   Current Outpatient Medications:     allopurinol (ZYLOPRIM) 100 MG tablet, TAKE 1 TABLET BY MOUTH DAILY, Disp: 30 tablet, Rfl: 2    cyclobenzaprine (FLEXERIL) 10 MG tablet, Take 1 tablet by mouth At Night As Needed for Muscle Spasms. (Patient not taking: Reported on 12/12/2024), Disp: 30 tablet, Rfl: 2    ibuprofen (ADVIL,MOTRIN) 800 MG tablet, Take 1 tablet by mouth Every 6 (Six) Hours As Needed  "for Mild Pain. (Patient not taking: Reported on 12/12/2024), Disp: , Rfl:     traMADol (ULTRAM) 50 MG tablet, Take 1 tablet by mouth Every 6 (Six) Hours As Needed for Moderate Pain. (Patient not taking: Reported on 12/12/2024), Disp: 20 tablet, Rfl: 0    Allergies:   Allergies   Allergen Reactions    Hydrocodone Irritability       I have reviewed and updated the patient's chief complaint, history of present illness, review of systems, past medical history, surgical history, family history, social history, medications and allergy list as appropriate.     Objective      Vital Signs:   Vitals:    12/12/24 1350   BP: 150/92   Weight: 98.3 kg (216 lb 12.8 oz)   Height: 177.8 cm (70\")     Body mass index is 31.11 kg/m².  BMI is >= 30 and <35. (Class 1 Obesity). The following options were offered after discussion;: exercise counseling/recommendations and nutrition counseling/recommendations      In this visit the patient was advised to stop smoking and was offered tobacco cessation measures and resources, including NRT and/or medication intervention. At least 5 minutes was spent on face-to-face counseling regarding smoking cessation.      Ortho Exam:  Cardiovascular System: Arterial Pulses Right: dorsalis pedis pulse normal. Varicosities Right: capillary refill test normal.   Lumbar Spine: Inspection: normal alignment.   Hip/Pelvis Appearance: Inspection: normal axial alignment.   Hips: Bony Palpation Right: no tenderness of the greater trochanter. Soft Tissue Palpation Right: no tenderness of the hip flexor muscles. Active Range of Motion Right: full. Strength Right: normal 5/5.   -FADIR.  Skin: Right Lower Extremity: normal. Left Lower Extremity: normal.   Neurologic: Sensation on the Right: L1 normal, L2 normal, L3 normal, L4 normal, and S2 normal. Sensation on the Left: L1 normal, L2 normal, L3 normal, L4 normal, and S2 normal.    Results Review:   Imaging Results (Last 24 Hours)       Procedure Component Value Units " Date/Time    XR Hip With or Without Pelvis 2 - 3 View Right [[id number]] Resulted: 12/12/24 1417     Updated: 12/12/24 1417    Narrative:      Indication: Right hip pain    Views: AP pelvis and lateral view of the hip are submitted.    Impression:  There are no acute findings. There is no fracture subluxation   or dislocation. Degenerative changes in the hip joint with mild flattening   of femoral head and subchondral sclerosis. No changes from prior.    Comparison: 6/12/2024.              Procedures    Assessment / Plan      Assessment/Plan:   Diagnoses and all orders for this visit:    1. Avascular necrosis of bone of hip, right (Primary)  -     XR Hip With or Without Pelvis 2 - 3 View Right    2. Arthritis of right hip    Follow-up on right hip osteoarthritis and avascular necrosis.  Radiographs today redemonstrate subchondral sclerosis and flattening of the femoral head.  No significant change compared to images from 6 months ago.  He is asymptomatic.  He has a physically demanding job and encouraged him to continue being active.  Activities as desired.  Can take cyclobenzaprine ibuprofen or tramadol as needed for pain control but only as needed.  Otherwise, follow-up in 6 months with repeat radiographs.  Should contact me sooner if he becomes symptomatic.    Follow Up:   Return in about 6 months (around 6/12/2025) for Recheck.      Anjum Savage MD  [facility]

## 2024-12-28 DIAGNOSIS — M10.071 ACUTE IDIOPATHIC GOUT OF RIGHT ANKLE: ICD-10-CM

## 2024-12-30 RX ORDER — ALLOPURINOL 100 MG/1
100 TABLET ORAL DAILY
Qty: 30 TABLET | Refills: 2 | Status: SHIPPED | OUTPATIENT
Start: 2024-12-30

## 2025-03-26 ENCOUNTER — OFFICE VISIT (OUTPATIENT)
Dept: FAMILY MEDICINE CLINIC | Facility: CLINIC | Age: 55
End: 2025-03-26
Payer: COMMERCIAL

## 2025-03-26 VITALS
TEMPERATURE: 98.2 F | BODY MASS INDEX: 31.52 KG/M2 | HEIGHT: 70 IN | RESPIRATION RATE: 18 BRPM | WEIGHT: 220.2 LBS | SYSTOLIC BLOOD PRESSURE: 138 MMHG | OXYGEN SATURATION: 97 % | HEART RATE: 94 BPM | DIASTOLIC BLOOD PRESSURE: 74 MMHG

## 2025-03-26 DIAGNOSIS — J01.40 ACUTE NON-RECURRENT PANSINUSITIS: Primary | ICD-10-CM

## 2025-03-26 DIAGNOSIS — H65.01 NON-RECURRENT ACUTE SEROUS OTITIS MEDIA OF RIGHT EAR: ICD-10-CM

## 2025-03-26 PROCEDURE — 99213 OFFICE O/P EST LOW 20 MIN: CPT | Performed by: FAMILY MEDICINE

## 2025-03-26 RX ORDER — PREDNISONE 10 MG/1
TABLET ORAL
Qty: 21 TABLET | Refills: 0 | Status: SHIPPED | OUTPATIENT
Start: 2025-03-26

## 2025-03-26 NOTE — PROGRESS NOTES
Chief Complaint  Sinus Problem (Has been doing lots of work outside, started 3 days ago. Having sinus pressure in head)    Subjective          Moreno Dinero presents to [facility]  Sinusitis  This is a new problem. The current episode started in the past 7 days. There has been no fever. Associated symptoms include congestion, coughing, headaches, sinus pressure and a sore throat. Treatments tried: Alysha seltzer. The treatment provided no relief.         The following portions of the patient's history were reviewed and updated as appropriate: allergies, current medications, past family history, past medical history, past social history, past surgical history, and problem list.    Objective      Physical Exam  Vitals reviewed.   HENT:      Right Ear: Ear canal and external ear normal. Tympanic membrane is erythematous.      Left Ear: Ear canal and external ear normal. A middle ear effusion is present.      Mouth/Throat:      Mouth: Mucous membranes are moist.      Pharynx: Oropharyngeal exudate present. No posterior oropharyngeal erythema.   Cardiovascular:      Rate and Rhythm: Normal rate.      Heart sounds: Normal heart sounds.   Pulmonary:      Effort: Pulmonary effort is normal.      Breath sounds: Normal breath sounds.   Neurological:      Mental Status: He is alert.        Result Review :                Assessment and Plan    Diagnoses and all orders for this visit:    1. Acute non-recurrent pansinusitis (Primary)  -     amoxicillin-clavulanate (AUGMENTIN) 875-125 MG per tablet; Take 1 tablet by mouth 2 (Two) Times a Day.  Dispense: 20 tablet; Refill: 0  -     predniSONE (DELTASONE) 10 MG (21) dose pack; Use as directed on package  Dispense: 21 tablet; Refill: 0    2. Non-recurrent acute serous otitis media of right ear  -     amoxicillin-clavulanate (AUGMENTIN) 875-125 MG per tablet; Take 1 tablet by mouth 2 (Two) Times a Day.  Dispense: 20 tablet; Refill: 0  -     predniSONE  (DELTASONE) 10 MG (21) dose pack; Use as directed on package  Dispense: 21 tablet; Refill: 0    Ok for work excuse today  Follow up if no improvement       Follow Up   Return if symptoms worsen or fail to improve.  Patient was given instructions and counseling regarding his condition or for health maintenance advice. Please see specific information pulled into the AVS if appropriate.

## 2025-04-05 DIAGNOSIS — M10.071 ACUTE IDIOPATHIC GOUT OF RIGHT ANKLE: ICD-10-CM

## 2025-04-07 NOTE — TELEPHONE ENCOUNTER
LVM FOR PATIENT TO SCHEDULE FOLLOWUP WITH PCP/ VASU FOR HUB TO RELAY AND SCHEDULE THEN SEND BACK TO CLINICAL POOL

## 2025-04-08 NOTE — TELEPHONE ENCOUNTER
PATIENT IS SCHEDULE WITH ANOTHER PROVIDER BECAUSE HE STATED HE COULDN'T WAIT FOR [facility]S NEXT AVAILABLE APPT DUE TO HAVING ACTIVE SYMPTOMS OF GOUT

## 2025-04-11 ENCOUNTER — OFFICE VISIT (OUTPATIENT)
Dept: FAMILY MEDICINE CLINIC | Facility: CLINIC | Age: 55
End: 2025-04-11
Payer: COMMERCIAL

## 2025-04-11 VITALS
BODY MASS INDEX: 32.53 KG/M2 | HEART RATE: 86 BPM | TEMPERATURE: 97.3 F | DIASTOLIC BLOOD PRESSURE: 82 MMHG | HEIGHT: 70 IN | RESPIRATION RATE: 14 BRPM | WEIGHT: 227.2 LBS | OXYGEN SATURATION: 96 % | SYSTOLIC BLOOD PRESSURE: 138 MMHG

## 2025-04-11 DIAGNOSIS — M10.071 ACUTE IDIOPATHIC GOUT OF RIGHT ANKLE: ICD-10-CM

## 2025-04-11 PROCEDURE — 99213 OFFICE O/P EST LOW 20 MIN: CPT | Performed by: NURSE PRACTITIONER

## 2025-04-11 RX ORDER — ALLOPURINOL 100 MG/1
100 TABLET ORAL DAILY
Qty: 90 TABLET | Refills: 1 | Status: SHIPPED | OUTPATIENT
Start: 2025-04-11

## 2025-04-11 RX ORDER — ALLOPURINOL 100 MG/1
100 TABLET ORAL DAILY
Qty: 30 TABLET | Refills: 2 | OUTPATIENT
Start: 2025-04-11

## 2025-04-11 NOTE — PROGRESS NOTES
"  Date: 2025   Patient Name: Moerno Dinero  : 1970   MRN: [id number]     Chief Complaint:    Chief Complaint   Patient presents with    Follow-up     Needs refill of allopurinol        History of Present Illness: Moreno Dinero is a 54 y.o. male who is here today to follow up for HPI   History of Present Illness  The patient presents for medication refills.    He is seeking a refill of his allopurinol prescription, which he takes daily at lunchtime. He reports no recent flare-ups of gout, with the last episode occurring in 2024. This episode was severe enough to necessitate a 7-week leave from work. Since then, he has not experienced any further flare-ups.    He underwent a colonoscopy a few months ago, and the results were normal.    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    MEDICATIONS  Allopurinol       Review of Systems:   Review of Systems   Constitutional:  Negative for fatigue.       I have reviewed the patients family history, social history, past medical history, past surgical history and have updated it as appropriate.     Medications:     Current Outpatient Medications:     allopurinol (ZYLOPRIM) 100 MG tablet, Take 1 tablet by mouth Daily., Disp: 90 tablet, Rfl: 1    Allergies:   Allergies   Allergen Reactions    Hydrocodone Irritability       PHQ-9 Total Score:      Physical Exam:  Vital Signs:   Vitals:    25 1555   BP: 138/82   Pulse: 86   Resp: 14   Temp: 97.3 °F (36.3 °C)   SpO2: 96%   Weight: 103 kg (227 lb 3.2 oz)   Height: 177.8 cm (70\")     Body mass index is 32.6 kg/m².           Physical Exam  Vitals and nursing note reviewed.   Constitutional:       Appearance: He is obese.   HENT:      Head: Normocephalic and atraumatic.   Cardiovascular:      Rate and Rhythm: Normal rate and regular rhythm.   Pulmonary:      Effort: Pulmonary effort is normal.      Breath sounds: Normal breath " sounds.   Musculoskeletal:      Lumbar back: Decreased range of motion.   Neurological:      Mental Status: He is alert and oriented to person, place, and time.           Assessment/Plan:   Diagnoses and all orders for this visit:    1. Acute idiopathic gout of right ankle  -     allopurinol (ZYLOPRIM) 100 MG tablet; Take 1 tablet by mouth Daily.  Dispense: 90 tablet; Refill: 1  -     CBC (No Diff)  -     Basic metabolic panel  -     Uric Acid       Assessment & Plan  1. Gout.  He reports no gout flare-ups for quite some time and has been taking allopurinol daily, which has been effective. A prescription for allopurinol will be sent to his pharmacy, providing a 90-day supply with refills. Blood work will be conducted today to assess uric acid levels, kidney function, and liver function.    2. Health maintenance.  He is due for his annual physical examination. He is advised to return in 6 months for his annual physical, during which fasting blood work will be performed, and preventive health measures will be discussed.    Follow-up  The patient will follow up in 6 months.    PROCEDURE  The patient underwent a colonoscopy a few months ago, which yielded normal results.    Patient or patient representative verbalized consent for the use of Ambient Listening during the visit with  DEBBIE Estrada for chart documentation. 4/11/2025  16:04 EDT    Follow Up:   Return in about 6 months (around 10/11/2025) for Annual physical.      Kat Cunningham. DEBBIE   [facility]

## 2025-04-12 LAB
BUN SERPL-MCNC: 13 MG/DL (ref 6–24)
BUN/CREAT SERPL: 18 (ref 9–20)
CALCIUM SERPL-MCNC: 9.6 MG/DL (ref 8.7–10.2)
CHLORIDE SERPL-SCNC: 103 MMOL/L (ref 96–106)
CO2 SERPL-SCNC: 25 MMOL/L (ref 20–29)
CREAT SERPL-MCNC: 0.74 MG/DL (ref 0.76–1.27)
EGFRCR SERPLBLD CKD-EPI 2021: 107 ML/MIN/1.73
ERYTHROCYTE [DISTWIDTH] IN BLOOD BY AUTOMATED COUNT: 12.3 % (ref 11.6–15.4)
GLUCOSE SERPL-MCNC: 98 MG/DL (ref 70–99)
HCT VFR BLD AUTO: 49.9 % (ref 37.5–51)
HGB BLD-MCNC: 16.8 G/DL (ref 13–17.7)
MCH RBC QN AUTO: 32.4 PG (ref 26.6–33)
MCHC RBC AUTO-ENTMCNC: 33.7 G/DL (ref 31.5–35.7)
MCV RBC AUTO: 96 FL (ref 79–97)
PLATELET # BLD AUTO: 178 X10E3/UL (ref 150–450)
POTASSIUM SERPL-SCNC: 4.6 MMOL/L (ref 3.5–5.2)
RBC # BLD AUTO: 5.19 X10E6/UL (ref 4.14–5.8)
SODIUM SERPL-SCNC: 140 MMOL/L (ref 134–144)
URATE SERPL-MCNC: 4.8 MG/DL (ref 3.8–8.4)
WBC # BLD AUTO: 7.8 X10E3/UL (ref 3.4–10.8)